# Patient Record
Sex: FEMALE | NOT HISPANIC OR LATINO
[De-identification: names, ages, dates, MRNs, and addresses within clinical notes are randomized per-mention and may not be internally consistent; named-entity substitution may affect disease eponyms.]

---

## 2017-02-13 PROBLEM — Z00.00 ENCOUNTER FOR PREVENTIVE HEALTH EXAMINATION: Status: ACTIVE | Noted: 2017-02-13

## 2017-04-13 ENCOUNTER — APPOINTMENT (OUTPATIENT)
Dept: ORTHOPEDIC SURGERY | Facility: CLINIC | Age: 52
End: 2017-04-13

## 2017-04-13 VITALS
HEART RATE: 91 BPM | SYSTOLIC BLOOD PRESSURE: 151 MMHG | TEMPERATURE: 97.9 F | WEIGHT: 293 LBS | DIASTOLIC BLOOD PRESSURE: 87 MMHG | BODY MASS INDEX: 53.92 KG/M2 | HEIGHT: 62 IN

## 2017-04-13 DIAGNOSIS — Z86.39 PERSONAL HISTORY OF OTHER ENDOCRINE, NUTRITIONAL AND METABOLIC DISEASE: ICD-10-CM

## 2017-04-13 DIAGNOSIS — E78.00 PURE HYPERCHOLESTEROLEMIA, UNSPECIFIED: ICD-10-CM

## 2017-04-13 DIAGNOSIS — M77.11 LATERAL EPICONDYLITIS, RIGHT ELBOW: ICD-10-CM

## 2017-04-13 DIAGNOSIS — Z86.79 PERSONAL HISTORY OF OTHER DISEASES OF THE CIRCULATORY SYSTEM: ICD-10-CM

## 2017-04-13 DIAGNOSIS — M18.12 UNILATERAL PRIMARY OSTEOARTHRITIS OF FIRST CARPOMETACARPAL JOINT, LEFT HAND: ICD-10-CM

## 2017-04-13 DIAGNOSIS — Z82.61 FAMILY HISTORY OF ARTHRITIS: ICD-10-CM

## 2017-04-13 DIAGNOSIS — Z87.891 PERSONAL HISTORY OF NICOTINE DEPENDENCE: ICD-10-CM

## 2017-04-13 RX ORDER — LOSARTAN POTASSIUM 100 MG/1
100 TABLET, FILM COATED ORAL
Refills: 0 | Status: ACTIVE | COMMUNITY

## 2017-04-13 RX ORDER — CHROMIUM 200 MCG
TABLET ORAL
Refills: 0 | Status: ACTIVE | COMMUNITY

## 2017-11-06 ENCOUNTER — APPOINTMENT (OUTPATIENT)
Dept: ORTHOPEDIC SURGERY | Facility: CLINIC | Age: 52
End: 2017-11-06

## 2018-04-09 ENCOUNTER — APPOINTMENT (OUTPATIENT)
Dept: ORTHOPEDIC SURGERY | Facility: CLINIC | Age: 53
End: 2018-04-09
Payer: MEDICAID

## 2018-04-09 VITALS
SYSTOLIC BLOOD PRESSURE: 143 MMHG | HEART RATE: 94 BPM | WEIGHT: 293 LBS | DIASTOLIC BLOOD PRESSURE: 79 MMHG | BODY MASS INDEX: 53.92 KG/M2 | HEIGHT: 62 IN

## 2018-04-09 DIAGNOSIS — M19.012 PRIMARY OSTEOARTHRITIS, LEFT SHOULDER: ICD-10-CM

## 2018-04-09 DIAGNOSIS — M65.312 TRIGGER THUMB, LEFT THUMB: ICD-10-CM

## 2018-04-09 DIAGNOSIS — M25.521 PAIN IN RIGHT ELBOW: ICD-10-CM

## 2018-04-09 DIAGNOSIS — M65.341 TRIGGER FINGER, RIGHT RING FINGER: ICD-10-CM

## 2018-04-09 DIAGNOSIS — M25.512 PAIN IN LEFT SHOULDER: ICD-10-CM

## 2018-04-09 DIAGNOSIS — M77.11 LATERAL EPICONDYLITIS, RIGHT ELBOW: ICD-10-CM

## 2018-04-09 DIAGNOSIS — M75.52 BURSITIS OF LEFT SHOULDER: ICD-10-CM

## 2018-04-09 DIAGNOSIS — G56.03 CARPAL TUNNEL SYNDROM,BILATERAL UPPER LIMBS: ICD-10-CM

## 2018-04-09 PROCEDURE — 20526 THER INJECTION CARP TUNNEL: CPT | Mod: 59,RT

## 2018-04-09 PROCEDURE — 99215 OFFICE O/P EST HI 40 MIN: CPT | Mod: 25

## 2018-04-09 PROCEDURE — 73080 X-RAY EXAM OF ELBOW: CPT | Mod: RT

## 2018-04-09 PROCEDURE — 20550 NJX 1 TENDON SHEATH/LIGAMENT: CPT | Mod: RT

## 2018-04-09 PROCEDURE — 73030 X-RAY EXAM OF SHOULDER: CPT | Mod: LT

## 2019-04-09 ENCOUNTER — APPOINTMENT (OUTPATIENT)
Age: 54
End: 2019-04-09
Payer: MEDICAID

## 2019-04-09 DIAGNOSIS — M17.10 UNILATERAL PRIMARY OSTEOARTHRITIS, UNSPECIFIED KNEE: ICD-10-CM

## 2019-04-09 PROCEDURE — 99214 OFFICE O/P EST MOD 30 MIN: CPT

## 2019-04-09 NOTE — PHYSICAL EXAM
[FreeTextEntry1] : Physical exam reveals a fully alert oriented patient's morbid obesity complaining about right knee pain on exam patient having full range of motion and no joint effusion no palpable mass no gross neurovascular deficit review of the MRI scan of the right knee demonstrates into a mallet and the derangement with a possible small ganglion cyst at the popliteal fossa at this stage no tumor could be identified patient was recommended to be followed by a sport knee specialist

## 2019-04-09 NOTE — HISTORY OF PRESENT ILLNESS
[FreeTextEntry1] : This is the first visit of May of 54 years old female for the last several years have been complaining about pain tenderness over the right knee related to into a mild knee derangement, recent MRI scan of the right knee demonstrated an incidental finding of a small fluid-filled cystic area at the popliteal fossa is suggested be a possible ganglion cyst unrelated to the patient complain of any pain

## 2021-07-06 ENCOUNTER — OUTPATIENT (OUTPATIENT)
Dept: OUTPATIENT SERVICES | Facility: HOSPITAL | Age: 56
LOS: 1 days | End: 2021-07-06
Payer: MEDICARE

## 2021-07-06 DIAGNOSIS — R06.02 SHORTNESS OF BREATH: ICD-10-CM

## 2021-07-06 PROCEDURE — A9500: CPT

## 2021-07-06 PROCEDURE — 78452 HT MUSCLE IMAGE SPECT MULT: CPT

## 2021-07-06 PROCEDURE — 78452 HT MUSCLE IMAGE SPECT MULT: CPT | Mod: 26

## 2021-07-06 PROCEDURE — 93016 CV STRESS TEST SUPVJ ONLY: CPT

## 2021-07-06 PROCEDURE — 93018 CV STRESS TEST I&R ONLY: CPT

## 2021-07-06 PROCEDURE — 93017 CV STRESS TEST TRACING ONLY: CPT

## 2021-07-09 ENCOUNTER — APPOINTMENT (OUTPATIENT)
Dept: OTOLARYNGOLOGY | Facility: CLINIC | Age: 56
End: 2021-07-09
Payer: MEDICARE

## 2021-07-09 VITALS
BODY MASS INDEX: 53.92 KG/M2 | WEIGHT: 293 LBS | OXYGEN SATURATION: 99 % | DIASTOLIC BLOOD PRESSURE: 86 MMHG | HEART RATE: 111 BPM | SYSTOLIC BLOOD PRESSURE: 157 MMHG | HEIGHT: 62 IN

## 2021-07-09 DIAGNOSIS — Z83.49 FAMILY HISTORY OF OTHER ENDOCRINE, NUTRITIONAL AND METABOLIC DISEASES: ICD-10-CM

## 2021-07-09 DIAGNOSIS — Z80.3 FAMILY HISTORY OF MALIGNANT NEOPLASM OF BREAST: ICD-10-CM

## 2021-07-09 PROCEDURE — 99204 OFFICE O/P NEW MOD 45 MIN: CPT | Mod: 25

## 2021-07-09 PROCEDURE — 31575 DIAGNOSTIC LARYNGOSCOPY: CPT

## 2021-07-09 RX ORDER — METFORMIN HYDROCHLORIDE 1000 MG/1
1000 TABLET, COATED ORAL
Refills: 0 | Status: ACTIVE | COMMUNITY

## 2021-07-09 RX ORDER — ROSUVASTATIN CALCIUM 20 MG/1
20 TABLET, FILM COATED ORAL
Refills: 0 | Status: ACTIVE | COMMUNITY

## 2021-07-09 RX ORDER — GLIPIZIDE 10 MG/1
10 TABLET ORAL
Refills: 0 | Status: ACTIVE | COMMUNITY

## 2021-07-09 RX ORDER — ASPIRIN 81 MG
81 TABLET, DELAYED RELEASE (ENTERIC COATED) ORAL
Refills: 0 | Status: ACTIVE | COMMUNITY

## 2021-07-09 RX ORDER — ESCITALOPRAM OXALATE 5 MG/1
TABLET, FILM COATED ORAL
Refills: 0 | Status: COMPLETED | COMMUNITY
End: 2021-07-09

## 2021-07-09 RX ORDER — SIMVASTATIN 80 MG/1
TABLET, FILM COATED ORAL
Refills: 0 | Status: COMPLETED | COMMUNITY
End: 2021-07-09

## 2021-07-09 RX ORDER — METFORMIN HYDROCHLORIDE 500 MG/1
500 TABLET, COATED ORAL
Refills: 0 | Status: COMPLETED | COMMUNITY
End: 2021-07-09

## 2021-07-09 RX ORDER — ESCITALOPRAM OXALATE 20 MG/1
20 TABLET, FILM COATED ORAL
Refills: 0 | Status: ACTIVE | COMMUNITY

## 2021-07-09 NOTE — HISTORY OF PRESENT ILLNESS
[de-identified] : This is a 56 yro female patient referred by Dr. Guzman  for thyroid nodule. This was found 2 years ago during physical exam/US.\par Pt c/o occasional dysphagia and forceful swallowing for the past 8 months. Mild right neck pain consistent over the past week. Denies dyspnea, dysphonia, fever, chills, weight loss. \par Patient denies h/o radiation and has no family h/o thyroid cancer. Sister with thyroid disease. \par Pt smoked <1 pack cig/day for about 7 years. Quit 32 years ago. \par Pt is f/up with rheumatologist for abnormal blood levels. Pt getting blood work done 7/16/21 for Dr. Guzman and PCP. \par \par US thyroid about 3 months ago showed 2 right lobe nodules. \par Biopsy 6/2/2021:\par -right thyroid medial pole nodule- benign follicular nodule\par -right thyroid midpole module- atypia of undetermined significance. Thyroseq positive. High prob of cancer.

## 2021-07-09 NOTE — REASON FOR VISIT
[Initial Evaluation] : an initial evaluation for [Other: _____] : [unfilled] [FreeTextEntry2] : thyroid nodule

## 2021-07-09 NOTE — CONSULT LETTER
[Dear  ___] : Dear  [unfilled], [Consult Letter:] : I had the pleasure of evaluating your patient, [unfilled]. [Please see my note below.] : Please see my note below. [Consult Closing:] : Thank you very much for allowing me to participate in the care of this patient.  If you have any questions, please do not hesitate to contact me. [Sincerely,] : Sincerely, [FreeTextEntry2] : Dr Abe Guzman [FreeTextEntry3] : \par Daniel Juarez MD, FACS\par \par Otolaryngology-Head and Neck Surgery\par [employer] [facility]\par

## 2021-07-13 ENCOUNTER — APPOINTMENT (OUTPATIENT)
Dept: ULTRASOUND IMAGING | Facility: CLINIC | Age: 56
End: 2021-07-13
Payer: MEDICARE

## 2021-07-13 ENCOUNTER — OUTPATIENT (OUTPATIENT)
Dept: OUTPATIENT SERVICES | Facility: HOSPITAL | Age: 56
LOS: 1 days | End: 2021-07-13

## 2021-07-13 DIAGNOSIS — C73 MALIGNANT NEOPLASM OF THYROID GLAND: ICD-10-CM

## 2021-07-13 PROCEDURE — 76536 US EXAM OF HEAD AND NECK: CPT | Mod: 26

## 2021-07-14 ENCOUNTER — TRANSCRIPTION ENCOUNTER (OUTPATIENT)
Age: 56
End: 2021-07-14

## 2021-08-31 ENCOUNTER — APPOINTMENT (OUTPATIENT)
Dept: OTOLARYNGOLOGY | Facility: CLINIC | Age: 56
End: 2021-08-31
Payer: MEDICARE

## 2021-08-31 VITALS
HEART RATE: 96 BPM | OXYGEN SATURATION: 96 % | DIASTOLIC BLOOD PRESSURE: 75 MMHG | WEIGHT: 293 LBS | HEIGHT: 62 IN | SYSTOLIC BLOOD PRESSURE: 145 MMHG | BODY MASS INDEX: 53.92 KG/M2

## 2021-08-31 VITALS — TEMPERATURE: 96.6 F

## 2021-08-31 DIAGNOSIS — Z83.49 FAMILY HISTORY OF OTHER ENDOCRINE, NUTRITIONAL AND METABOLIC DISEASES: ICD-10-CM

## 2021-08-31 DIAGNOSIS — G47.30 SLEEP APNEA, UNSPECIFIED: ICD-10-CM

## 2021-08-31 PROCEDURE — 99214 OFFICE O/P EST MOD 30 MIN: CPT

## 2021-08-31 NOTE — CONSULT LETTER
[Dear  ___] : Dear  [unfilled], [Courtesy Letter:] : I had the pleasure of seeing your patient, [unfilled], in my office today. [Please see my note below.] : Please see my note below. [Consult Closing:] : Thank you very much for allowing me to participate in the care of this patient.  If you have any questions, please do not hesitate to contact me. [Sincerely,] : Sincerely, [FreeTextEntry2] : Dr Abe Guzman  [FreeTextEntry3] : \par Daniel Juarez MD, FACS\par \par Otolaryngology-Head and Neck Surgery\par [employer] at [facility]\par

## 2021-08-31 NOTE — HISTORY OF PRESENT ILLNESS
[de-identified] : This is a 56 yro female patient w/o sleep apnea, referred by Dr. Guzman  for thyroid nodule. This was found 2 years ago during physical exam/US. Pt went elsewhere for second opinion, has decided to have surgery with us and here today to discuss the surgery. \par Pt c/o occasional dysphagia and forceful swallowing for the past 9 months. \par Left side neck swelling and tenderness for about 1 week.\par Denies dyspnea, dysphonia, hemoptysis, fever, chills, weight loss. \par Pt smoked <1 pack cig/day for about 7 years. Quit 32 years ago. \par \par \par Biopsy 6/2/2021:\par -right thyroid medial pole nodule- benign follicular nodule\par -right thyroid midpole module- atypia of undetermined significance. Thyroseq positive. High prob of cancer. \par \par US thyroid 7/13/2021: IMPRESSION:\par No evidence of cervical adenopathy.\par Right thyroid nodules.\par Right Lobe: 4.6 cm x  1.5 cm x 2.1 cm. The gland is normal in echogenicity. There is 1.1 x 0.6 x 1.1 cm heterogeneous predominantly isoechoic nodule in the midpole anteriorly and 1.0 x 0.9 x 0.8 cm heterogeneous predominant isoechoic nodule in the midpole posteriorly. There are no calcifications\par

## 2021-09-17 ENCOUNTER — APPOINTMENT (OUTPATIENT)
Dept: PULMONOLOGY | Facility: CLINIC | Age: 56
End: 2021-09-17
Payer: MEDICARE

## 2021-09-17 ENCOUNTER — NON-APPOINTMENT (OUTPATIENT)
Age: 56
End: 2021-09-17

## 2021-09-17 VITALS
DIASTOLIC BLOOD PRESSURE: 78 MMHG | WEIGHT: 293 LBS | RESPIRATION RATE: 16 BRPM | HEART RATE: 84 BPM | SYSTOLIC BLOOD PRESSURE: 144 MMHG | OXYGEN SATURATION: 97 % | HEIGHT: 62 IN | BODY MASS INDEX: 53.92 KG/M2

## 2021-09-17 DIAGNOSIS — Z87.891 PERSONAL HISTORY OF NICOTINE DEPENDENCE: ICD-10-CM

## 2021-09-17 DIAGNOSIS — Z23 ENCOUNTER FOR IMMUNIZATION: ICD-10-CM

## 2021-09-17 DIAGNOSIS — Z01.811 ENCOUNTER FOR PREPROCEDURAL RESPIRATORY EXAMINATION: ICD-10-CM

## 2021-09-17 PROCEDURE — 99204 OFFICE O/P NEW MOD 45 MIN: CPT

## 2021-09-17 RX ORDER — LANCETS
EACH MISCELLANEOUS
Qty: 102 | Refills: 0 | Status: ACTIVE | COMMUNITY
Start: 2021-09-14

## 2021-09-17 RX ORDER — BLOOD SUGAR DIAGNOSTIC
STRIP MISCELLANEOUS
Qty: 200 | Refills: 0 | Status: ACTIVE | COMMUNITY
Start: 2021-09-14

## 2021-09-17 NOTE — HISTORY OF PRESENT ILLNESS
[TextBox_4] : 57 yo lady referred by Dr Juarez for presurgical evaluation prior to thyroid surgery for possible neoplasm\par \par Known obesity, s/p lap band and then removal in past\par Diabetes on Glipizide and metformin\par HT on losartan\par HLD on meds\par \par Known previous 'severe' ANGLE and was on CPAP\par Has not been on CPAP for years tho\par Patient concerned about ANGLE with respect to upcoming surgery\par \par Minimal smoking history, yearly bronchitis in the past usually in winter\par No known asthma, no hx pneumonia\par \par Lives with two teenage daughters, \par Not working now, used to be \par No drug allergies

## 2021-09-17 NOTE — ASSESSMENT
[FreeTextEntry1] : 57 yo lady referred by Dr Juarez for presurgical evaluation prior to thyroid surgery for possible neoplasm\par \par Known obesity, s/p lap band and then removal in past\par Diabetes on Glipizide and metformin\par HT on losartan\par HLD on meds\par \par Known previous 'severe' ANGLE and was on CPAP\par Has not been on CPAP for years tho\par Patient concerned about ANGLE with respect to upcoming surgery\par \par \par \par Imp. Hx of signficant ANGLE,  \par Agree with patient for full assessment prior to her thyroid surgery\par F/u appt with sleep doctor after Home study to be arranged\par \par Doubt signficant underlying pulmonary disease but will obtain CXR and PFTs and revisit here

## 2021-09-17 NOTE — CONSULT LETTER
[Dear  ___] : Dear  [unfilled], [FreeTextEntry1] : I had the pleasure of evaluating your patient, ALLI ARANGO , in the office today.  Please review my consultation and evaluation report that follows below.  Please do not hesitate to call me if further information is necessary or if you wish to discuss ongoing care or diagnostic work-up.   \par I very much appreciate your referral and it is a privilege to be able to provide care for your patient.\par \par Sincerely,\par  \par Nikita King MD, MHCM, FACP, GABRIEL-C\par Pulmonary Medicine\par  of Medicine\par [employer] [facility] [employer] at [facility]\par [email or web address]\par \par [facility] [place]\par [address]\[place]  [address]\par    Fax \par \par [facility] [place]\par [address]\par [place], [place] \par \par

## 2021-09-21 ENCOUNTER — APPOINTMENT (OUTPATIENT)
Dept: PULMONOLOGY | Facility: CLINIC | Age: 56
End: 2021-09-21

## 2021-09-24 ENCOUNTER — OUTPATIENT (OUTPATIENT)
Dept: OUTPATIENT SERVICES | Facility: HOSPITAL | Age: 56
LOS: 1 days | End: 2021-09-24
Payer: MEDICARE

## 2021-09-24 ENCOUNTER — APPOINTMENT (OUTPATIENT)
Age: 56
End: 2021-09-24
Payer: MEDICARE

## 2021-09-24 DIAGNOSIS — G47.33 OBSTRUCTIVE SLEEP APNEA (ADULT) (PEDIATRIC): ICD-10-CM

## 2021-09-24 PROCEDURE — 95806 SLEEP STUDY UNATT&RESP EFFT: CPT | Mod: 26

## 2021-09-24 PROCEDURE — 95806 SLEEP STUDY UNATT&RESP EFFT: CPT

## 2021-10-04 ENCOUNTER — APPOINTMENT (OUTPATIENT)
Dept: PULMONOLOGY | Facility: CLINIC | Age: 56
End: 2021-10-04
Payer: MEDICARE

## 2021-10-04 VITALS
WEIGHT: 218 LBS | HEIGHT: 62 IN | OXYGEN SATURATION: 97 % | RESPIRATION RATE: 16 BRPM | BODY MASS INDEX: 40.12 KG/M2 | HEART RATE: 84 BPM | SYSTOLIC BLOOD PRESSURE: 138 MMHG | DIASTOLIC BLOOD PRESSURE: 68 MMHG

## 2021-10-04 DIAGNOSIS — E66.9 OBESITY, UNSPECIFIED: ICD-10-CM

## 2021-10-04 PROCEDURE — 99214 OFFICE O/P EST MOD 30 MIN: CPT

## 2021-10-04 NOTE — HISTORY OF PRESENT ILLNESS
[Obstructive Sleep Apnea] : obstructive sleep apnea [Daytime Somnolence] : daytime somnolence [Recent  Weight Gain] : recent weight gain [Snoring] : snoring [TextBox_4] : 57 yo lady referred by Dr Juarez for presurgical evaluation prior to thyroid surgery for possible neoplasm\par \par Known obesity, s/p lap band and then removal in past\par Diabetes on Glipizide and metformin\par HT on losartan\par HLD on meds\par \par Known previous 'severe' ANGLE and was on CPAP\par Has not been on CPAP for years though\par Patient concerned about ANGLE with respect to upcoming surgery\par \par Minimal smoking history, yearly bronchitis in the past usually in winter\par No known asthma, no hx pneumonia\par \par Lives with two teenage daughters, \par Not working now, used to be \par No drug allergies [ESS] : 4

## 2021-10-04 NOTE — DISCUSSION/SUMMARY
[FreeTextEntry1] : Morbid Obesity \par Mild ANGLE characterized by hypopneas without significant desaturation\par No significant pulmonary or sleep medicine related contraindication to thyroid surgery under general anesthesia or conscious sedation without the need for CPAP therapy\par The patient wants to try APAP again to combat fatigue but this will likely not occur prior to her surgery\par CPAP at 8 cm H20 can be used PRN in recovery or at night if she stays in the hospital post operatively, however, truncal elevation of 30 degrees and nasal 02 at 2 LPM should be more than sufficient in the hospital setting \par

## 2021-10-05 ENCOUNTER — OUTPATIENT (OUTPATIENT)
Dept: OUTPATIENT SERVICES | Facility: HOSPITAL | Age: 56
LOS: 1 days | End: 2021-10-05
Payer: MEDICARE

## 2021-10-05 DIAGNOSIS — G47.33 OBSTRUCTIVE SLEEP APNEA (ADULT) (PEDIATRIC): ICD-10-CM

## 2021-10-05 PROCEDURE — 71046 X-RAY EXAM CHEST 2 VIEWS: CPT | Mod: 26

## 2021-10-25 ENCOUNTER — APPOINTMENT (OUTPATIENT)
Dept: DISASTER EMERGENCY | Facility: CLINIC | Age: 56
End: 2021-10-25

## 2021-10-25 ENCOUNTER — OUTPATIENT (OUTPATIENT)
Dept: OUTPATIENT SERVICES | Facility: HOSPITAL | Age: 56
LOS: 1 days | End: 2021-10-25
Payer: MEDICARE

## 2021-10-25 VITALS
HEART RATE: 76 BPM | WEIGHT: 293 LBS | OXYGEN SATURATION: 98 % | TEMPERATURE: 98 F | SYSTOLIC BLOOD PRESSURE: 143 MMHG | DIASTOLIC BLOOD PRESSURE: 73 MMHG | RESPIRATION RATE: 16 BRPM | HEIGHT: 62 IN

## 2021-10-25 DIAGNOSIS — F41.9 ANXIETY DISORDER, UNSPECIFIED: ICD-10-CM

## 2021-10-25 DIAGNOSIS — E66.01 MORBID (SEVERE) OBESITY DUE TO EXCESS CALORIES: ICD-10-CM

## 2021-10-25 DIAGNOSIS — G47.33 OBSTRUCTIVE SLEEP APNEA (ADULT) (PEDIATRIC): ICD-10-CM

## 2021-10-25 DIAGNOSIS — C73 MALIGNANT NEOPLASM OF THYROID GLAND: ICD-10-CM

## 2021-10-25 DIAGNOSIS — Z98.891 HISTORY OF UTERINE SCAR FROM PREVIOUS SURGERY: Chronic | ICD-10-CM

## 2021-10-25 DIAGNOSIS — Z98.84 BARIATRIC SURGERY STATUS: Chronic | ICD-10-CM

## 2021-10-25 DIAGNOSIS — E11.9 TYPE 2 DIABETES MELLITUS WITHOUT COMPLICATIONS: ICD-10-CM

## 2021-10-25 DIAGNOSIS — I10 ESSENTIAL (PRIMARY) HYPERTENSION: ICD-10-CM

## 2021-10-25 DIAGNOSIS — R79.1 ABNORMAL COAGULATION PROFILE: ICD-10-CM

## 2021-10-25 LAB
A1C WITH ESTIMATED AVERAGE GLUCOSE RESULT: 8.5 % — HIGH (ref 4–5.6)
ALBUMIN SERPL ELPH-MCNC: 4.5 G/DL — SIGNIFICANT CHANGE UP (ref 3.3–5)
ALP SERPL-CCNC: 67 U/L — SIGNIFICANT CHANGE UP (ref 40–120)
ALT FLD-CCNC: 40 U/L — HIGH (ref 4–33)
ANION GAP SERPL CALC-SCNC: 13 MMOL/L — SIGNIFICANT CHANGE UP (ref 7–14)
APTT BLD: 35.7 SEC — SIGNIFICANT CHANGE UP (ref 27–36.3)
AST SERPL-CCNC: 41 U/L — HIGH (ref 4–32)
BILIRUB SERPL-MCNC: 0.3 MG/DL — SIGNIFICANT CHANGE UP (ref 0.2–1.2)
BUN SERPL-MCNC: 9 MG/DL — SIGNIFICANT CHANGE UP (ref 7–23)
CALCIUM SERPL-MCNC: 9.7 MG/DL — SIGNIFICANT CHANGE UP (ref 8.4–10.5)
CHLORIDE SERPL-SCNC: 104 MMOL/L — SIGNIFICANT CHANGE UP (ref 98–107)
CO2 SERPL-SCNC: 26 MMOL/L — SIGNIFICANT CHANGE UP (ref 22–31)
CREAT SERPL-MCNC: 0.7 MG/DL — SIGNIFICANT CHANGE UP (ref 0.5–1.3)
ESTIMATED AVERAGE GLUCOSE: 197 — SIGNIFICANT CHANGE UP
GLUCOSE SERPL-MCNC: 91 MG/DL — SIGNIFICANT CHANGE UP (ref 70–99)
HCT VFR BLD CALC: 38.8 % — SIGNIFICANT CHANGE UP (ref 34.5–45)
HGB BLD-MCNC: 12.3 G/DL — SIGNIFICANT CHANGE UP (ref 11.5–15.5)
INR BLD: 1.09 RATIO — SIGNIFICANT CHANGE UP (ref 0.88–1.16)
MCHC RBC-ENTMCNC: 28.3 PG — SIGNIFICANT CHANGE UP (ref 27–34)
MCHC RBC-ENTMCNC: 31.7 GM/DL — LOW (ref 32–36)
MCV RBC AUTO: 89.4 FL — SIGNIFICANT CHANGE UP (ref 80–100)
NRBC # BLD: 0 /100 WBCS — SIGNIFICANT CHANGE UP
NRBC # FLD: 0 K/UL — SIGNIFICANT CHANGE UP
PLATELET # BLD AUTO: 322 K/UL — SIGNIFICANT CHANGE UP (ref 150–400)
POTASSIUM SERPL-MCNC: 4.3 MMOL/L — SIGNIFICANT CHANGE UP (ref 3.5–5.3)
POTASSIUM SERPL-SCNC: 4.3 MMOL/L — SIGNIFICANT CHANGE UP (ref 3.5–5.3)
PROT SERPL-MCNC: 7.7 G/DL — SIGNIFICANT CHANGE UP (ref 6–8.3)
PROTHROM AB SERPL-ACNC: 12.5 SEC — SIGNIFICANT CHANGE UP (ref 10.6–13.6)
RBC # BLD: 4.34 M/UL — SIGNIFICANT CHANGE UP (ref 3.8–5.2)
RBC # FLD: 13.7 % — SIGNIFICANT CHANGE UP (ref 10.3–14.5)
SODIUM SERPL-SCNC: 143 MMOL/L — SIGNIFICANT CHANGE UP (ref 135–145)
WBC # BLD: 9.18 K/UL — SIGNIFICANT CHANGE UP (ref 3.8–10.5)
WBC # FLD AUTO: 9.18 K/UL — SIGNIFICANT CHANGE UP (ref 3.8–10.5)

## 2021-10-25 PROCEDURE — 93010 ELECTROCARDIOGRAM REPORT: CPT

## 2021-10-25 RX ORDER — SODIUM CHLORIDE 9 MG/ML
1000 INJECTION, SOLUTION INTRAVENOUS
Refills: 0 | Status: DISCONTINUED | OUTPATIENT
Start: 2021-11-08 | End: 2021-11-08

## 2021-10-25 RX ORDER — SODIUM CHLORIDE 9 MG/ML
3 INJECTION INTRAMUSCULAR; INTRAVENOUS; SUBCUTANEOUS EVERY 8 HOURS
Refills: 0 | Status: DISCONTINUED | OUTPATIENT
Start: 2021-11-08 | End: 2021-11-12

## 2021-10-25 NOTE — H&P PST ADULT - HISTORY OF PRESENT ILLNESS
55 y/o female with type 2 DM, HTN, HLD, Mild ANGLE (not on CPAP), Morbid Obesity, Fatty liver, depression and anxiety presents to PST preop for total thyroidectomy. pt diagnosed with thyroid nodule 2 year ago. pt had an ultrasound of the neck which showed two right lobe nodules.  s/p biopsy on 6/2/21. pt states one nodule showed atypical cells and "90% of cancer".  55 y/o female with Type 2 DM, HTN, HLD, Mild ANGLE (not on CPAP), Morbid Obesity, Fatty liver, depression and anxiety presents to PST preop for total thyroidectomy. pt diagnosed with thyroid nodule 2 years ago. pt had an ultrasound of the neck which showed two right lobe nodules.  s/p biopsy on 6/2/21. pt states one nodule showed atypical cells and "90% risk of cancer".

## 2021-10-25 NOTE — H&P PST ADULT - PROBLEM SELECTOR PLAN 7
pt reports "abnormal clotting protein"- seen by hematology in August pt reports "abnormal clotting protein"- seen by hematology in August  last hematology visit note requested

## 2021-10-25 NOTE — H&P PST ADULT - OTHER CARE PROVIDERS
Cardiologist Dr. Kimball [phone]    Pulmonologist Dr. Nikita King  Endocrinologist Dr. Guzman  Hematologist Dr. Radha Merida [phone]

## 2021-10-25 NOTE — H&P PST ADULT - PROBLEM SELECTOR PLAN 1
preop for total thyroidectomy on 11/8/21  preop instructions given, pt verbalized understanding  GI prophylaxis and chlorhexidine wash provided  pt is scheduled for COVID testing preop

## 2021-10-25 NOTE — H&P PST ADULT - PROBLEM SELECTOR PLAN 2
pt will take blood pressure meds Losartan AM of surgery as prescribed  cardiac clearance and ECHO requested (pt reports SOB on exertion, h/o HTN)

## 2021-10-25 NOTE — H&P PST ADULT - NSICDXPASTMEDICALHX_GEN_ALL_CORE_FT
PAST MEDICAL HISTORY:  Anxiety and depression     Bilateral carpal tunnel syndrome     Bulging lumbar disc     Fatty liver     HLD (hyperlipidemia)     HTN (hypertension)     Left shoulder pain     Malignant neoplasm of thyroid gland     Morbid obesity     OA (osteoarthritis)     ANGLE (obstructive sleep apnea) mild ANGLE    Right tennis elbow and left elbow    Thyroid nodule     Type 2 diabetes mellitus

## 2021-10-25 NOTE — H&P PST ADULT - NEGATIVE GENERAL GENITOURINARY SYMPTOMS
no hematuria/no flank pain L/no flank pain R/no bladder infections/no incontinence/normal urinary frequency

## 2021-10-25 NOTE — H&P PST ADULT - HEMATOLOGY/LYMPHATICS COMMENTS
pt reports "abnormal blood clotting proteins" seen by hematologist in July and told levels were normal and not to be concerned

## 2021-10-25 NOTE — H&P PST ADULT - NSICDXPASTSURGICALHX_GEN_ALL_CORE_FT
PAST SURGICAL HISTORY:  H/O  section  and     History of laparoscopic adjustable gastric banding  and removed in

## 2021-10-25 NOTE — H&P PST ADULT - PROBLEM SELECTOR PLAN 3
pt will hold glipizide AM of surgery   metformin to be held 24 hours preop  accu check to be assessed on admission

## 2021-10-26 LAB — SARS-COV-2 N GENE NPH QL NAA+PROBE: NOT DETECTED

## 2021-10-29 ENCOUNTER — APPOINTMENT (OUTPATIENT)
Dept: PULMONOLOGY | Facility: CLINIC | Age: 56
End: 2021-10-29
Payer: MEDICARE

## 2021-10-29 VITALS
RESPIRATION RATE: 15 BRPM | BODY MASS INDEX: 53.92 KG/M2 | WEIGHT: 293 LBS | DIASTOLIC BLOOD PRESSURE: 82 MMHG | SYSTOLIC BLOOD PRESSURE: 128 MMHG | HEIGHT: 62 IN | OXYGEN SATURATION: 95 % | HEART RATE: 88 BPM

## 2021-10-29 VITALS — BODY MASS INDEX: 53.92 KG/M2 | WEIGHT: 293 LBS | HEIGHT: 62 IN

## 2021-10-29 DIAGNOSIS — Z01.818 ENCOUNTER FOR OTHER PREPROCEDURAL EXAMINATION: ICD-10-CM

## 2021-10-29 PROCEDURE — 85018 HEMOGLOBIN: CPT | Mod: QW

## 2021-10-29 PROCEDURE — 94727 GAS DIL/WSHOT DETER LNG VOL: CPT

## 2021-10-29 PROCEDURE — 99214 OFFICE O/P EST MOD 30 MIN: CPT | Mod: 25

## 2021-10-29 PROCEDURE — 94010 BREATHING CAPACITY TEST: CPT

## 2021-10-29 PROCEDURE — 94729 DIFFUSING CAPACITY: CPT

## 2021-10-29 NOTE — ASSESSMENT
[FreeTextEntry1] : \par 57 yo lady referred by Dr Juarez for presurgical evaluation prior to thyroid surgery for possible neoplasm\par \par Known obesity, s/p lap band and then removal in past\par Diabetes on Glipizide and metformin\par HT on losartan\par HLD on meds\par \par Known previous 'severe' ANGLE and was on CPAP\par Has not been on CPAP for years tho\par Patient concerned about ANGLE with respect to upcoming surgery\par \par Minimal smoking history, yearly bronchitis in the past usually in winter\par No known asthma, no hx pneumonia\par \par Lives with two teenage daughters, \par Not working now, used to be \par No drug allergies. \par \par Patient had sleep study, Mild ANGLE noted, CPAP ordered by Dr Ozuna indicated that she may proceed with surgery from an ANGLE standpoint\par PFTs show possible small airways disease but most changes appear due to obesity\par \par Imp 57 yo lady with hx bronchitis, HT DM and HLD and morbid obesity\par Respiratory status is optimized although weight loss is advised\par No pulmonary contraindication to planned thyroid surgery

## 2021-10-29 NOTE — PROCEDURE
[FreeTextEntry1] : Normal TLC and DLCo but decr in FRC c/w body habitus\par FEV1% fine but FVC is lower that slow VC\par Decrease in midexpir flow perhaps due to small airways disease

## 2021-10-29 NOTE — HISTORY OF PRESENT ILLNESS
[TextBox_4] : \par 57 yo lady referred by Dr Juarez for presurgical evaluation prior to thyroid surgery for possible neoplasm\par \par Known obesity, s/p lap band and then removal in past\par Diabetes on Glipizide and metformin\par HT on losartan\par HLD on meds\par \par Known previous 'severe' ANGLE and was on CPAP\par Has not been on CPAP for years tho\par Patient concerned about ANGLE with respect to upcoming surgery\par \par Minimal smoking history, yearly bronchitis in the past usually in winter\par No known asthma, no hx pneumonia\par \par Lives with two teenage daughters, \par Not working now, used to be \par No drug allergies.

## 2021-10-29 NOTE — CONSULT LETTER
[Dear  ___] : Dear  [unfilled], [FreeTextEntry1] : I had the pleasure of evaluating your patient, ALLI ARANGO , in the office today.  Please review my consultation and evaluation report that follows below.  Please do not hesitate to call me if further information is necessary or if you wish to discuss ongoing care or diagnostic work-up.   \par I very much appreciate your referral and it is a privilege to be able to provide care for your patient.\par \par Sincerely,\par  \par Nikita King MD, MHCM, FACP, GABRIEL-C\par Pulmonary Medicine\par  of Medicine\par [employer] [facility] [employer] at [facility]\par [email or web address]\par \par [facility] in [place]\par [address]\[place]  [address]\par    Fax \par \par [facility] [place]\par [address]\par [place], [place] \par \par

## 2021-11-05 ENCOUNTER — APPOINTMENT (OUTPATIENT)
Dept: DISASTER EMERGENCY | Facility: CLINIC | Age: 56
End: 2021-11-05

## 2021-11-06 LAB — SARS-COV-2 N GENE NPH QL NAA+PROBE: NOT DETECTED

## 2021-11-07 ENCOUNTER — TRANSCRIPTION ENCOUNTER (OUTPATIENT)
Age: 56
End: 2021-11-07

## 2021-11-08 ENCOUNTER — NON-APPOINTMENT (OUTPATIENT)
Age: 56
End: 2021-11-08

## 2021-11-08 ENCOUNTER — RESULT REVIEW (OUTPATIENT)
Age: 56
End: 2021-11-08

## 2021-11-08 ENCOUNTER — INPATIENT (INPATIENT)
Facility: HOSPITAL | Age: 56
LOS: 3 days | Discharge: HOME | End: 2021-11-12
Attending: OTOLARYNGOLOGY | Admitting: OTOLARYNGOLOGY
Payer: MEDICARE

## 2021-11-08 ENCOUNTER — APPOINTMENT (OUTPATIENT)
Dept: OTOLARYNGOLOGY | Facility: HOSPITAL | Age: 56
End: 2021-11-08

## 2021-11-08 VITALS
HEART RATE: 81 BPM | RESPIRATION RATE: 16 BRPM | HEIGHT: 62 IN | SYSTOLIC BLOOD PRESSURE: 136 MMHG | WEIGHT: 293 LBS | OXYGEN SATURATION: 95 % | DIASTOLIC BLOOD PRESSURE: 65 MMHG | TEMPERATURE: 98 F

## 2021-11-08 DIAGNOSIS — C73 MALIGNANT NEOPLASM OF THYROID GLAND: ICD-10-CM

## 2021-11-08 DIAGNOSIS — Z98.84 BARIATRIC SURGERY STATUS: Chronic | ICD-10-CM

## 2021-11-08 DIAGNOSIS — Z98.891 HISTORY OF UTERINE SCAR FROM PREVIOUS SURGERY: Chronic | ICD-10-CM

## 2021-11-08 LAB
ANION GAP SERPL CALC-SCNC: 15 MMOL/L — HIGH (ref 7–14)
BLOOD GAS ARTERIAL - LYTES,HGB,ICA,LACT RESULT: SIGNIFICANT CHANGE UP
BUN SERPL-MCNC: 14 MG/DL — SIGNIFICANT CHANGE UP (ref 7–23)
CALCIUM SERPL-MCNC: 9.1 MG/DL — SIGNIFICANT CHANGE UP (ref 8.4–10.5)
CHLORIDE SERPL-SCNC: 101 MMOL/L — SIGNIFICANT CHANGE UP (ref 98–107)
CO2 SERPL-SCNC: 25 MMOL/L — SIGNIFICANT CHANGE UP (ref 22–31)
CREAT SERPL-MCNC: 0.84 MG/DL — SIGNIFICANT CHANGE UP (ref 0.5–1.3)
GAS PNL BLDA: SIGNIFICANT CHANGE UP
GLUCOSE BLDC GLUCOMTR-MCNC: 108 MG/DL — HIGH (ref 70–99)
GLUCOSE BLDC GLUCOMTR-MCNC: 116 MG/DL — HIGH (ref 70–99)
GLUCOSE BLDC GLUCOMTR-MCNC: 155 MG/DL — HIGH (ref 70–99)
GLUCOSE BLDC GLUCOMTR-MCNC: 178 MG/DL — HIGH (ref 70–99)
GLUCOSE BLDC GLUCOMTR-MCNC: 183 MG/DL — HIGH (ref 70–99)
GLUCOSE BLDC GLUCOMTR-MCNC: 223 MG/DL — HIGH (ref 70–99)
GLUCOSE BLDC GLUCOMTR-MCNC: 227 MG/DL — HIGH (ref 70–99)
GLUCOSE BLDC GLUCOMTR-MCNC: 241 MG/DL — HIGH (ref 70–99)
GLUCOSE BLDC GLUCOMTR-MCNC: 248 MG/DL — HIGH (ref 70–99)
GLUCOSE BLDC GLUCOMTR-MCNC: 257 MG/DL — HIGH (ref 70–99)
GLUCOSE BLDC GLUCOMTR-MCNC: 331 MG/DL — HIGH (ref 70–99)
GLUCOSE BLDC GLUCOMTR-MCNC: 93 MG/DL — SIGNIFICANT CHANGE UP (ref 70–99)
GLUCOSE SERPL-MCNC: 281 MG/DL — HIGH (ref 70–99)
HCT VFR BLD CALC: 40.4 % — SIGNIFICANT CHANGE UP (ref 34.5–45)
HGB BLD-MCNC: 12.6 G/DL — SIGNIFICANT CHANGE UP (ref 11.5–15.5)
MAGNESIUM SERPL-MCNC: 1.9 MG/DL — SIGNIFICANT CHANGE UP (ref 1.6–2.6)
MCHC RBC-ENTMCNC: 28.3 PG — SIGNIFICANT CHANGE UP (ref 27–34)
MCHC RBC-ENTMCNC: 31.2 GM/DL — LOW (ref 32–36)
MCV RBC AUTO: 90.6 FL — SIGNIFICANT CHANGE UP (ref 80–100)
NRBC # BLD: 0 /100 WBCS — SIGNIFICANT CHANGE UP
NRBC # FLD: 0 K/UL — SIGNIFICANT CHANGE UP
PHOSPHATE SERPL-MCNC: 4.2 MG/DL — SIGNIFICANT CHANGE UP (ref 2.5–4.5)
PLATELET # BLD AUTO: 300 K/UL — SIGNIFICANT CHANGE UP (ref 150–400)
POTASSIUM SERPL-MCNC: 4.1 MMOL/L — SIGNIFICANT CHANGE UP (ref 3.5–5.3)
POTASSIUM SERPL-SCNC: 4.1 MMOL/L — SIGNIFICANT CHANGE UP (ref 3.5–5.3)
PTH-INTACT FLD-MCNC: 29 PG/ML — SIGNIFICANT CHANGE UP (ref 15–65)
RBC # BLD: 4.46 M/UL — SIGNIFICANT CHANGE UP (ref 3.8–5.2)
RBC # FLD: 13.7 % — SIGNIFICANT CHANGE UP (ref 10.3–14.5)
SODIUM SERPL-SCNC: 141 MMOL/L — SIGNIFICANT CHANGE UP (ref 135–145)
WBC # BLD: 12.77 K/UL — HIGH (ref 3.8–10.5)
WBC # FLD AUTO: 12.77 K/UL — HIGH (ref 3.8–10.5)

## 2021-11-08 PROCEDURE — 88334 PATH CONSLTJ SURG CYTO XM EA: CPT | Mod: 26,59

## 2021-11-08 PROCEDURE — 88331 PATH CONSLTJ SURG 1 BLK 1SPC: CPT | Mod: 26

## 2021-11-08 PROCEDURE — 71045 X-RAY EXAM CHEST 1 VIEW: CPT | Mod: 26

## 2021-11-08 PROCEDURE — 88307 TISSUE EXAM BY PATHOLOGIST: CPT | Mod: 26

## 2021-11-08 PROCEDURE — 88305 TISSUE EXAM BY PATHOLOGIST: CPT | Mod: 26

## 2021-11-08 RX ORDER — ONDANSETRON 8 MG/1
4 TABLET, FILM COATED ORAL ONCE
Refills: 0 | Status: COMPLETED | OUTPATIENT
Start: 2021-11-08 | End: 2021-11-08

## 2021-11-08 RX ORDER — ESCITALOPRAM OXALATE 10 MG/1
20 TABLET, FILM COATED ORAL DAILY
Refills: 0 | Status: DISCONTINUED | OUTPATIENT
Start: 2021-11-09 | End: 2021-11-12

## 2021-11-08 RX ORDER — CHOLECALCIFEROL (VITAMIN D3) 125 MCG
1 CAPSULE ORAL
Qty: 0 | Refills: 0 | DISCHARGE

## 2021-11-08 RX ORDER — DEXTROSE 50 % IN WATER 50 %
25 SYRINGE (ML) INTRAVENOUS
Refills: 0 | Status: DISCONTINUED | OUTPATIENT
Start: 2021-11-08 | End: 2021-11-12

## 2021-11-08 RX ORDER — LOSARTAN POTASSIUM 100 MG/1
100 TABLET, FILM COATED ORAL DAILY
Refills: 0 | Status: DISCONTINUED | OUTPATIENT
Start: 2021-11-09 | End: 2021-11-12

## 2021-11-08 RX ORDER — DEXTROSE 50 % IN WATER 50 %
15 SYRINGE (ML) INTRAVENOUS ONCE
Refills: 0 | Status: DISCONTINUED | OUTPATIENT
Start: 2021-11-08 | End: 2021-11-12

## 2021-11-08 RX ORDER — INSULIN HUMAN 100 [IU]/ML
5 INJECTION, SOLUTION SUBCUTANEOUS
Qty: 50 | Refills: 0 | Status: DISCONTINUED | OUTPATIENT
Start: 2021-11-08 | End: 2021-11-08

## 2021-11-08 RX ORDER — FAMOTIDINE 10 MG/ML
20 INJECTION INTRAVENOUS ONCE
Refills: 0 | Status: DISCONTINUED | OUTPATIENT
Start: 2021-11-08 | End: 2021-11-12

## 2021-11-08 RX ORDER — ACETAMINOPHEN 500 MG
650 TABLET ORAL EVERY 6 HOURS
Refills: 0 | Status: DISCONTINUED | OUTPATIENT
Start: 2021-11-08 | End: 2021-11-12

## 2021-11-08 RX ORDER — INSULIN LISPRO 100/ML
20 VIAL (ML) SUBCUTANEOUS EVERY 8 HOURS
Refills: 0 | Status: DISCONTINUED | OUTPATIENT
Start: 2021-11-08 | End: 2021-11-09

## 2021-11-08 RX ORDER — HYDROMORPHONE HYDROCHLORIDE 2 MG/ML
0.5 INJECTION INTRAMUSCULAR; INTRAVENOUS; SUBCUTANEOUS
Refills: 0 | Status: DISCONTINUED | OUTPATIENT
Start: 2021-11-08 | End: 2021-11-08

## 2021-11-08 RX ORDER — INSULIN HUMAN 100 [IU]/ML
10.5 INJECTION, SOLUTION SUBCUTANEOUS
Qty: 100 | Refills: 0 | Status: DISCONTINUED | OUTPATIENT
Start: 2021-11-08 | End: 2021-11-09

## 2021-11-08 RX ORDER — INSULIN GLARGINE 100 [IU]/ML
75 INJECTION, SOLUTION SUBCUTANEOUS ONCE
Refills: 0 | Status: COMPLETED | OUTPATIENT
Start: 2021-11-08 | End: 2021-11-08

## 2021-11-08 RX ORDER — OXYCODONE HYDROCHLORIDE 5 MG/1
10 TABLET ORAL EVERY 6 HOURS
Refills: 0 | Status: DISCONTINUED | OUTPATIENT
Start: 2021-11-08 | End: 2021-11-12

## 2021-11-08 RX ORDER — SODIUM CHLORIDE 9 MG/ML
1000 INJECTION, SOLUTION INTRAVENOUS
Refills: 0 | Status: DISCONTINUED | OUTPATIENT
Start: 2021-11-08 | End: 2021-11-09

## 2021-11-08 RX ORDER — ASPIRIN/CALCIUM CARB/MAGNESIUM 324 MG
0 TABLET ORAL
Qty: 0 | Refills: 0 | DISCHARGE

## 2021-11-08 RX ORDER — LOSARTAN POTASSIUM 100 MG/1
1 TABLET, FILM COATED ORAL
Qty: 0 | Refills: 0 | DISCHARGE

## 2021-11-08 RX ORDER — DEXTROSE 50 % IN WATER 50 %
50 SYRINGE (ML) INTRAVENOUS
Refills: 0 | Status: DISCONTINUED | OUTPATIENT
Start: 2021-11-08 | End: 2021-11-12

## 2021-11-08 RX ORDER — LEVOTHYROXINE SODIUM 125 MCG
25 TABLET ORAL DAILY
Refills: 0 | Status: DISCONTINUED | OUTPATIENT
Start: 2021-11-09 | End: 2021-11-12

## 2021-11-08 RX ORDER — INFLUENZA VIRUS VACCINE 15; 15; 15; 15 UG/.5ML; UG/.5ML; UG/.5ML; UG/.5ML
0.5 SUSPENSION INTRAMUSCULAR ONCE
Refills: 0 | Status: DISCONTINUED | OUTPATIENT
Start: 2021-11-08 | End: 2021-11-12

## 2021-11-08 RX ORDER — SODIUM CHLORIDE 9 MG/ML
1000 INJECTION, SOLUTION INTRAVENOUS
Refills: 0 | Status: DISCONTINUED | OUTPATIENT
Start: 2021-11-08 | End: 2021-11-12

## 2021-11-08 RX ORDER — ESCITALOPRAM OXALATE 10 MG/1
1 TABLET, FILM COATED ORAL
Qty: 0 | Refills: 0 | DISCHARGE

## 2021-11-08 RX ORDER — LEVOTHYROXINE SODIUM 125 MCG
200 TABLET ORAL DAILY
Refills: 0 | Status: DISCONTINUED | OUTPATIENT
Start: 2021-11-09 | End: 2021-11-12

## 2021-11-08 RX ORDER — LABETALOL HCL 100 MG
10 TABLET ORAL ONCE
Refills: 0 | Status: COMPLETED | OUTPATIENT
Start: 2021-11-08 | End: 2021-11-08

## 2021-11-08 RX ORDER — ROSUVASTATIN CALCIUM 5 MG/1
1 TABLET ORAL
Qty: 0 | Refills: 0 | DISCHARGE

## 2021-11-08 RX ORDER — OXYCODONE HYDROCHLORIDE 5 MG/1
5 TABLET ORAL EVERY 6 HOURS
Refills: 0 | Status: DISCONTINUED | OUTPATIENT
Start: 2021-11-08 | End: 2021-11-12

## 2021-11-08 RX ORDER — GLUCAGON INJECTION, SOLUTION 0.5 MG/.1ML
1 INJECTION, SOLUTION SUBCUTANEOUS ONCE
Refills: 0 | Status: DISCONTINUED | OUTPATIENT
Start: 2021-11-08 | End: 2021-11-12

## 2021-11-08 RX ORDER — INSULIN GLARGINE 100 [IU]/ML
75 INJECTION, SOLUTION SUBCUTANEOUS AT BEDTIME
Refills: 0 | Status: DISCONTINUED | OUTPATIENT
Start: 2021-11-09 | End: 2021-11-10

## 2021-11-08 RX ORDER — DIPHENHYDRAMINE HCL 50 MG
12.5 CAPSULE ORAL ONCE
Refills: 0 | Status: COMPLETED | OUTPATIENT
Start: 2021-11-08 | End: 2021-11-08

## 2021-11-08 RX ADMIN — INSULIN HUMAN 10.5 UNIT(S)/HR: 100 INJECTION, SOLUTION SUBCUTANEOUS at 21:13

## 2021-11-08 RX ADMIN — SODIUM CHLORIDE 30 MILLILITER(S): 9 INJECTION, SOLUTION INTRAVENOUS at 12:22

## 2021-11-08 RX ADMIN — INSULIN HUMAN 10 UNIT(S)/HR: 100 INJECTION, SOLUTION SUBCUTANEOUS at 19:12

## 2021-11-08 RX ADMIN — INSULIN GLARGINE 75 UNIT(S): 100 INJECTION, SOLUTION SUBCUTANEOUS at 22:57

## 2021-11-08 RX ADMIN — INSULIN HUMAN 12 UNIT(S)/HR: 100 INJECTION, SOLUTION SUBCUTANEOUS at 18:57

## 2021-11-08 RX ADMIN — Medication 12.5 MILLIGRAM(S): at 12:28

## 2021-11-08 RX ADMIN — SODIUM CHLORIDE 3 MILLILITER(S): 9 INJECTION INTRAMUSCULAR; INTRAVENOUS; SUBCUTANEOUS at 21:14

## 2021-11-08 RX ADMIN — SODIUM CHLORIDE 3 MILLILITER(S): 9 INJECTION INTRAMUSCULAR; INTRAVENOUS; SUBCUTANEOUS at 13:42

## 2021-11-08 RX ADMIN — INSULIN HUMAN 9 UNIT(S)/HR: 100 INJECTION, SOLUTION SUBCUTANEOUS at 15:58

## 2021-11-08 RX ADMIN — INSULIN HUMAN 11 UNIT(S)/HR: 100 INJECTION, SOLUTION SUBCUTANEOUS at 17:41

## 2021-11-08 RX ADMIN — ONDANSETRON 4 MILLIGRAM(S): 8 TABLET, FILM COATED ORAL at 18:10

## 2021-11-08 RX ADMIN — Medication 10 MILLIGRAM(S): at 17:02

## 2021-11-08 RX ADMIN — SODIUM CHLORIDE 30 MILLILITER(S): 9 INJECTION, SOLUTION INTRAVENOUS at 15:59

## 2021-11-08 RX ADMIN — SODIUM CHLORIDE 30 MILLILITER(S): 9 INJECTION, SOLUTION INTRAVENOUS at 22:57

## 2021-11-08 RX ADMIN — INSULIN HUMAN 5 UNIT(S)/HR: 100 INJECTION, SOLUTION SUBCUTANEOUS at 12:28

## 2021-11-08 NOTE — CONSULT NOTE ADULT - ATTENDING COMMENTS
Patient s/p total thyroidectomy requiring sicu for insulin gtt. Patient will plan to start basal insulin and wean off insulin gtt    I have personally interviewed when possible and examined the patient, reviewed data and laboratory tests/x-rays and all pertinent electronic images.  I was physically present for the key portions of the evaluation and management (E/M) service provided.   The SICU team has a constant risk benefit analyzes discussion with the primary team, all consultants, House Staff and PA's on all decisions.  These diagnoses are unrelated to the surgical procedure noted above.  I meet with family if needed to get further history, discuss the case and make care decisions for this patient who might not be able to participate.  Time involved in performance of separately billable procedures was not counted toward my critical care time. There is no overlap.  I spent 30 minutes ( 0800Hrs-0915Hrs in AM/ 1600Hrs-1715Hrs in PM, or other time indicated) of critical care time for the diagnoses, assessment, plan and interventions.  This time excludes time spent on separate procedures and teaching.

## 2021-11-08 NOTE — PATIENT PROFILE ADULT - PACKS YRS CALCULATION
Physical Therapy  3CD PT attempt NOTE:    Patient not seen in therapy today.     Re-attempt plan: per established plan of care    Pt is at an MRI. Will re-attempt as schedule allows.                                   0

## 2021-11-09 ENCOUNTER — TRANSCRIPTION ENCOUNTER (OUTPATIENT)
Age: 56
End: 2021-11-09

## 2021-11-09 LAB
ANION GAP SERPL CALC-SCNC: 12 MMOL/L — SIGNIFICANT CHANGE UP (ref 7–14)
BASOPHILS # BLD AUTO: 0.03 K/UL — SIGNIFICANT CHANGE UP (ref 0–0.2)
BASOPHILS NFR BLD AUTO: 0.2 % — SIGNIFICANT CHANGE UP (ref 0–2)
BUN SERPL-MCNC: 13 MG/DL — SIGNIFICANT CHANGE UP (ref 7–23)
CALCIUM SERPL-MCNC: 8.8 MG/DL — SIGNIFICANT CHANGE UP (ref 8.4–10.5)
CALCIUM SERPL-MCNC: 8.9 MG/DL — SIGNIFICANT CHANGE UP (ref 8.4–10.5)
CHLORIDE SERPL-SCNC: 101 MMOL/L — SIGNIFICANT CHANGE UP (ref 98–107)
CO2 SERPL-SCNC: 26 MMOL/L — SIGNIFICANT CHANGE UP (ref 22–31)
COVID-19 NUCLEOCAPSID GAM AB INTERP: POSITIVE
COVID-19 NUCLEOCAPSID TOTAL GAM ANTIBODY RESULT: 198 INDEX — HIGH
COVID-19 SPIKE DOMAIN AB INTERP: POSITIVE
COVID-19 SPIKE DOMAIN ANTIBODY RESULT: >250 U/ML — HIGH
CREAT SERPL-MCNC: 0.73 MG/DL — SIGNIFICANT CHANGE UP (ref 0.5–1.3)
EOSINOPHIL # BLD AUTO: 0 K/UL — SIGNIFICANT CHANGE UP (ref 0–0.5)
EOSINOPHIL NFR BLD AUTO: 0 % — SIGNIFICANT CHANGE UP (ref 0–6)
GLUCOSE BLDC GLUCOMTR-MCNC: 116 MG/DL — HIGH (ref 70–99)
GLUCOSE BLDC GLUCOMTR-MCNC: 150 MG/DL — HIGH (ref 70–99)
GLUCOSE BLDC GLUCOMTR-MCNC: 161 MG/DL — HIGH (ref 70–99)
GLUCOSE BLDC GLUCOMTR-MCNC: 177 MG/DL — HIGH (ref 70–99)
GLUCOSE BLDC GLUCOMTR-MCNC: 182 MG/DL — HIGH (ref 70–99)
GLUCOSE BLDC GLUCOMTR-MCNC: 184 MG/DL — HIGH (ref 70–99)
GLUCOSE BLDC GLUCOMTR-MCNC: 88 MG/DL — SIGNIFICANT CHANGE UP (ref 70–99)
GLUCOSE BLDC GLUCOMTR-MCNC: 91 MG/DL — SIGNIFICANT CHANGE UP (ref 70–99)
GLUCOSE SERPL-MCNC: 149 MG/DL — HIGH (ref 70–99)
HCT VFR BLD CALC: 36.1 % — SIGNIFICANT CHANGE UP (ref 34.5–45)
HGB BLD-MCNC: 11.6 G/DL — SIGNIFICANT CHANGE UP (ref 11.5–15.5)
IANC: 9.84 K/UL — HIGH (ref 1.5–8.5)
IMM GRANULOCYTES NFR BLD AUTO: 0.5 % — SIGNIFICANT CHANGE UP (ref 0–1.5)
LYMPHOCYTES # BLD AUTO: 1.5 K/UL — SIGNIFICANT CHANGE UP (ref 1–3.3)
LYMPHOCYTES # BLD AUTO: 12.3 % — LOW (ref 13–44)
MAGNESIUM SERPL-MCNC: 2 MG/DL — SIGNIFICANT CHANGE UP (ref 1.6–2.6)
MCHC RBC-ENTMCNC: 28.5 PG — SIGNIFICANT CHANGE UP (ref 27–34)
MCHC RBC-ENTMCNC: 32.1 GM/DL — SIGNIFICANT CHANGE UP (ref 32–36)
MCV RBC AUTO: 88.7 FL — SIGNIFICANT CHANGE UP (ref 80–100)
MONOCYTES # BLD AUTO: 0.74 K/UL — SIGNIFICANT CHANGE UP (ref 0–0.9)
MONOCYTES NFR BLD AUTO: 6.1 % — SIGNIFICANT CHANGE UP (ref 2–14)
NEUTROPHILS # BLD AUTO: 9.84 K/UL — HIGH (ref 1.8–7.4)
NEUTROPHILS NFR BLD AUTO: 80.9 % — HIGH (ref 43–77)
NRBC # BLD: 0 /100 WBCS — SIGNIFICANT CHANGE UP
NRBC # FLD: 0 K/UL — SIGNIFICANT CHANGE UP
PHOSPHATE SERPL-MCNC: 4.3 MG/DL — SIGNIFICANT CHANGE UP (ref 2.5–4.5)
PLATELET # BLD AUTO: 305 K/UL — SIGNIFICANT CHANGE UP (ref 150–400)
POTASSIUM SERPL-MCNC: 4.3 MMOL/L — SIGNIFICANT CHANGE UP (ref 3.5–5.3)
POTASSIUM SERPL-SCNC: 4.3 MMOL/L — SIGNIFICANT CHANGE UP (ref 3.5–5.3)
RBC # BLD: 4.07 M/UL — SIGNIFICANT CHANGE UP (ref 3.8–5.2)
RBC # FLD: 13.8 % — SIGNIFICANT CHANGE UP (ref 10.3–14.5)
SARS-COV-2 IGG+IGM SERPL QL IA: 198 INDEX — HIGH
SARS-COV-2 IGG+IGM SERPL QL IA: >250 U/ML — HIGH
SARS-COV-2 IGG+IGM SERPL QL IA: POSITIVE
SARS-COV-2 IGG+IGM SERPL QL IA: POSITIVE
SODIUM SERPL-SCNC: 139 MMOL/L — SIGNIFICANT CHANGE UP (ref 135–145)
WBC # BLD: 12.17 K/UL — HIGH (ref 3.8–10.5)
WBC # FLD AUTO: 12.17 K/UL — HIGH (ref 3.8–10.5)

## 2021-11-09 PROCEDURE — 99232 SBSQ HOSP IP/OBS MODERATE 35: CPT

## 2021-11-09 RX ORDER — INSULIN LISPRO 100/ML
VIAL (ML) SUBCUTANEOUS
Refills: 0 | Status: DISCONTINUED | OUTPATIENT
Start: 2021-11-09 | End: 2021-11-10

## 2021-11-09 RX ORDER — INSULIN LISPRO 100/ML
20 VIAL (ML) SUBCUTANEOUS
Refills: 0 | Status: DISCONTINUED | OUTPATIENT
Start: 2021-11-09 | End: 2021-11-09

## 2021-11-09 RX ORDER — INSULIN LISPRO 100/ML
VIAL (ML) SUBCUTANEOUS AT BEDTIME
Refills: 0 | Status: DISCONTINUED | OUTPATIENT
Start: 2021-11-09 | End: 2021-11-12

## 2021-11-09 RX ADMIN — ESCITALOPRAM OXALATE 20 MILLIGRAM(S): 10 TABLET, FILM COATED ORAL at 11:59

## 2021-11-09 RX ADMIN — Medication 2: at 09:00

## 2021-11-09 RX ADMIN — SODIUM CHLORIDE 50 MILLILITER(S): 9 INJECTION, SOLUTION INTRAVENOUS at 05:53

## 2021-11-09 RX ADMIN — Medication 2: at 11:56

## 2021-11-09 RX ADMIN — SODIUM CHLORIDE 3 MILLILITER(S): 9 INJECTION INTRAMUSCULAR; INTRAVENOUS; SUBCUTANEOUS at 12:19

## 2021-11-09 RX ADMIN — INSULIN GLARGINE 75 UNIT(S): 100 INJECTION, SOLUTION SUBCUTANEOUS at 22:28

## 2021-11-09 RX ADMIN — Medication 200 MICROGRAM(S): at 05:54

## 2021-11-09 RX ADMIN — Medication 20 UNIT(S): at 11:56

## 2021-11-09 RX ADMIN — Medication 25 MICROGRAM(S): at 05:54

## 2021-11-09 RX ADMIN — LOSARTAN POTASSIUM 100 MILLIGRAM(S): 100 TABLET, FILM COATED ORAL at 05:54

## 2021-11-09 RX ADMIN — SODIUM CHLORIDE 3 MILLILITER(S): 9 INJECTION INTRAMUSCULAR; INTRAVENOUS; SUBCUTANEOUS at 21:59

## 2021-11-09 RX ADMIN — Medication 20 UNIT(S): at 09:00

## 2021-11-09 RX ADMIN — SODIUM CHLORIDE 3 MILLILITER(S): 9 INJECTION INTRAMUSCULAR; INTRAVENOUS; SUBCUTANEOUS at 05:48

## 2021-11-09 NOTE — PROGRESS NOTE ADULT - ASSESSMENT
ASSESSMENT:  56y Female with PMH of morbid obesity, ANGLE (not on CPAP at home), HTN, HLD, fatty liver, depression and anxiety, here now s/p total thryoidectomy on 11/8 for thyroid nodule which showed atypical cells. SICU consulted for hyperglycemia throughout the case, s/p multiple insulin boluses and started on insulin infusion intraop, which was continued post op.       PLAN:   #Neurologic:   - awake and alert  - Tylenol PRN for pain    #Respiratory:   - satting mid 90s on supplemental O2  - not on home O2 prior to surgery, would titrate down once the patient becomes more awake  - f/u CXR    #Cardiovascular:   - h/o HTN, HLD  - Hypertensive, received 1x 10mg labetalol IVP in unit  - c/w home losartan  - maintain MAP >65    #Gastrointestinal/Nutrition:   - carbohydrate controlled diet     #Renal/Genitourinary:   - voiding spontaneously  - monitor I's and O's    #Hematologic:   - No active issues  - SCDs for DVT ppx    #Infectious Disease:   - monitor for fevers  - no abx needed    #Endocrine:   - h/o DM at home, A1c 8.5, on oral agents at home  - insulin pushes x2 in OR, then placed on insulin infusion (now at 5u/hour)  - TDD = 212U  - transitioned off of insulin gtt w/ 75U Lantus and 20U Admelog w/ meals  - moderate ISS given TDD>0.5U/kg  - FS Q2 w/ goal 140-180  - c/w synthroid   - endocrine consulted for poorly managed DM    Lines/Tubes:  - PIV  - alesha    Disposition: SICU  --------------------------------------------------------------------------------------    Critical Care Diagnoses:  - hyperglycemia requiring insulin infusion     ASSESSMENT:  56y Female with PMH of morbid obesity, ANGLE (not on CPAP at home), HTN, HLD, fatty liver, depression and anxiety, here now s/p total thryoidectomy on 11/8 for thyroid nodule which showed atypical cells. SICU consulted for hyperglycemia throughout the case, s/p multiple insulin boluses and started on insulin infusion intraop, which was continued post op.       PLAN:   #Neurologic:   - awake and alert  - Tylenol PRN for pain    #Respiratory:   - satting mid 90s on NC  - not on home O2 prior to surgery, would titrate down once the patient becomes more awake  - f/u CXR    #Cardiovascular:   - h/o HTN, HLD  - Hypertensive, received 1x 10mg labetalol IVP in unit  - c/w home losartan  - maintain MAP >65    #Gastrointestinal/Nutrition:   - carbohydrate controlled diet     #Renal/Genitourinary:   - voiding spontaneously  - monitor I's and O's    #Hematologic:   - No active issues  - SCDs for DVT ppx    #Infectious Disease:   - monitor for fevers  - no abx needed    #Endocrine:   - h/o DM at home, A1c 8.5, on oral agents at home  - insulin pushes x2 in OR, then placed on insulin infusion (now at 5u/hour)  - TDD = 212U  - transitioned off of insulin gtt w/ 75U Lantus and 20U Admelog w/ meals  - moderate ISS given TDD>0.5U/kg  - FS Q2 w/ goal 140-180  - c/w synthroid   - endocrine consulted for poorly managed DM    Lines/Tubes:  - BARI powell/ezequiel    Disposition: Listed for floor  --------------------------------------------------------------------------------------    Critical Care Diagnoses:  - hyperglycemia requiring insulin infusion

## 2021-11-09 NOTE — DISCHARGE NOTE PROVIDER - NSDCCPCAREPLAN_GEN_ALL_CORE_FT
PRINCIPAL DISCHARGE DIAGNOSIS  Diagnosis: Thyroid nodule  Assessment and Plan of Treatment:        PRINCIPAL DISCHARGE DIAGNOSIS  Diagnosis: Thyroid nodule  Assessment and Plan of Treatment: - Please follow up with Dr. Juarez outpatient as scheduled

## 2021-11-09 NOTE — DISCHARGE NOTE PROVIDER - CARE PROVIDER_API CALL
Daniel Juarez)  Otolaryngology  [address] [place] [address]  Phone: [phone]  Fax: [phone]  Follow Up Time:

## 2021-11-09 NOTE — DISCHARGE NOTE PROVIDER - NSDCFUSCHEDAPPT_GEN_ALL_CORE_FT
ALLI ARANGO ; 12/29/2021 ; NPP Puled [address] [place] [address] ALLI ARANGO ; 11/22/2021 ; [facility] [address] [facility]  ALLI ARANGO ; 12/29/2021 ; [facility] ALLI Trevino ; 01/11/2022 ; [facility] [address] [facility]

## 2021-11-09 NOTE — DISCHARGE NOTE PROVIDER - NSDCMRMEDTOKEN_GEN_ALL_CORE_FT
aspirin 81 mg oral tablet: orally once a day. last dose 11/1/21  escitalopram 20 mg oral tablet: 1 tab(s) orally once a day in AM   glipiZIDE 10 mg oral tablet: orally 2 times a day  losartan 100 mg oral tablet: 1 tab(s) orally once a day in AM   metFORMIN 1000 mg oral tablet: 1 tab(s) orally 2 times a day  rosuvastatin 20 mg oral tablet: 1 tab(s) orally once a day (at bedtime)  Vitamin D3 125 mcg (5000 intl units) oral tablet: 1 tab(s) orally once a day   acetaminophen 325 mg oral tablet: 2 tab(s) orally every 6 hours, As needed, Mild Pain (1 - 3)  Admelog 100 units/mL injectable solution: 9 unit(s) injectable 3 times a day   Admelog 100 units/mL injectable solution: 9 unit(s) subcutaneous 3 times a day (with meals)   aspirin 81 mg oral tablet: orally once a day. last dose 11/1/21  escitalopram 20 mg oral tablet: 1 tab(s) orally once a day in AM   glucometer (per patient&#x27;s insurance): Test blood sugars four times a day. Dispense #1 glucometer.  insulin glargine: 27 unit(s) injectable prn   lancets: 1 application subcutaneously 4 times a day   Lantus Solostar Pen 100 units/mL subcutaneous solution: 27 unit(s) subcutaneous once a day   levothyroxine 200 mcg (0.2 mg) oral tablet: 1 tab(s) orally once a day  losartan 100 mg oral tablet: 1 tab(s) orally once a day in AM   rosuvastatin 20 mg oral tablet: 1 tab(s) orally once a day (at bedtime)  Vitamin D3 125 mcg (5000 intl units) oral tablet: 1 tab(s) orally once a day

## 2021-11-09 NOTE — PROGRESS NOTE ADULT - SUBJECTIVE AND OBJECTIVE BOX
MD paged the following:    Critical lab result for bili total:  Results   11/8/2020 07:24  Bilirubin Total: 24.8 (HH)  Alkaline Phosphatase: 171 (H)  ALT: 164 (H)  AST: 180 (H)  Bilirubin Direct: 21.1 (H)    Thanks!    Krystina nAgel RN on 11/8/2020 at 10:54 AM     SICU Daily Progress Note  =====================================================  Interval/Overnight Events:       - insulin drip titrated down  - -110  - TDD @ 212U  - 75U Glargine, 2hr infusion coverage, 20U Lispo w/ meals      HPI:  57 y/o female with Type 2 DM, HTN, HLD, Mild ANGLE (not on CPAP), Morbid Obesity, Fatty liver, depression and anxiety presents to PST preop for total thyroidectomy. pt diagnosed with thyroid nodule 2 years ago. pt had an ultrasound of the neck which showed two right lobe nodules.  s/p biopsy on 21. pt states one nodule showed atypical cells and "90% risk of cancer".       PMH:  PAST MEDICAL & SURGICAL HISTORY:  Morbid obesity    HTN (hypertension)    HLD (hyperlipidemia)    Type 2 diabetes mellitus    Anxiety and depression    Fatty liver    Bilateral carpal tunnel syndrome    OA (osteoarthritis)    Bulging lumbar disc    Right tennis elbow  and left elbow    Left shoulder pain    Thyroid nodule    Malignant neoplasm of thyroid gland    ANGLE (obstructive sleep apnea)  mild ANGLE    H/O  section   and     History of laparoscopic adjustable gastric banding   and removed in         ALLERGIES:  No Known Allergies      --------------------------------------------------------------------------------------    MEDICATIONS:    Neurologic Medications  acetaminophen     Tablet .. 650 milliGRAM(s) Oral every 6 hours PRN Mild Pain (1 - 3)  escitalopram 20 milliGRAM(s) Oral daily  oxyCODONE    IR 5 milliGRAM(s) Oral every 6 hours PRN Moderate Pain (4 - 6)  oxyCODONE    IR 10 milliGRAM(s) Oral every 6 hours PRN Severe Pain (7 - 10)    Respiratory Medications    Cardiovascular Medications  losartan 100 milliGRAM(s) Oral daily    Gastrointestinal Medications  dextrose 5% + sodium chloride 0.45%. 1000 milliLiter(s) IV Continuous <Continuous>  dextrose 5%. 1000 milliLiter(s) IV Continuous <Continuous>  dextrose 5%. 1000 milliLiter(s) IV Continuous <Continuous>  famotidine Injectable 20 milliGRAM(s) IV Push once PRN nausea/vomiting  sodium chloride 0.9% lock flush 3 milliLiter(s) IV Push every 8 hours    Genitourinary Medications    Hematologic/Oncologic Medications  influenza   Vaccine 0.5 milliLiter(s) IntraMuscular once    Antimicrobial/Immunologic Medications    Endocrine/Metabolic Medications  dextrose 40% Gel 15 Gram(s) Oral once  dextrose 50% Injectable 50 milliLiter(s) IV Push every 15 minutes  dextrose 50% Injectable 25 milliLiter(s) IV Push every 15 minutes  glucagon  Injectable 1 milliGRAM(s) IntraMuscular once  insulin glargine Injectable (LANTUS) 75 Unit(s) SubCutaneous at bedtime  insulin lispro Injectable (ADMELOG) 20 Unit(s) SubCutaneous every 8 hours  insulin regular Infusion 10.5 Unit(s)/Hr IV Continuous <Continuous>  levothyroxine 200 MICROGram(s) Oral daily  levothyroxine 25 MICROGram(s) Oral daily    Topical/Other Medications    --------------------------------------------------------------------------------------    VITAL SIGNS:  ICU Vital Signs Last 24 Hrs  T(C): 36.4 (2021 00:00), Max: 37 (2021 20:00)  T(F): 97.6 (2021 00:00), Max: 98.6 (2021 20:00)  HR: 70 (2021 00:00) (70 - 82)  BP: 157/55 (2021 18:00) (136/65 - 157/55)  BP(mean): 78 (2021 18:00) (78 - 98)  ABP: 139/66 (2021 00:00) (132/59 - 186/83)  ABP(mean): 91 (2021 00:00) (85 - 129)  RR: 17 (2021 00:00) (12 - 25)  SpO2: 97% (2021 00:00) (90% - 100%)    --------------------------------------------------------------------------------------    INS AND OUTS:  I&O's Summary    2021 07:01  -  2021 00:46  --------------------------------------------------------  IN: 592 mL / OUT: 1685 mL / NET: -1093 mL      --------------------------------------------------------------------------------------          EXAM  General/Neuro  RASS:   GCS:   Exam:   -awake and alert, able to answer questions appropriately   -incision site c/d/i from thyroidectomy    Respiratory  Exam: CTA  h/o ANGLE  [] Tracheostomy   [] Intubated  Mechanical Ventilation:     Cardiovascular  Exam: RRR  Cardiac Rhythm: Normal Sinus Rhythm  ECHO:     GI  Exam: Abdomen, soft, NT, ND  Current Diet:  Consistent Carb Diet    Extremities  Exam: Extremities warm, pink, well-perfused.        Derm:  Exam: Good skin turgor, no skin breakdown.        METABOLIC / FLUIDS / ELECTROLYTES  dextrose 5% + sodium chloride 0.45%. 1000 milliLiter(s) IV Continuous <Continuous>  dextrose 5%. 1000 milliLiter(s) IV Continuous <Continuous>  dextrose 5%. 1000 milliLiter(s) IV Continuous <Continuous>  sodium chloride 0.9% lock flush 3 milliLiter(s) IV Push every 8 hours      HEMATOLOGIC  [x] VTE Prophylaxis:   Transfusions:	[] PRBC	[] Platelets		[] FFP	[] Cryoprecipitate    INFECTIOUS DISEASE  Antimicrobials/Immunologic Medications:  influenza   Vaccine 0.5 milliLiter(s) IntraMuscular once      TUBES / LINES / DRAINS  [x] Peripheral IV  [] Central Venous Line     	[] R	[] L	[] IJ	[] Fem	[] SC	Date Placed:   [x] Arterial Line		[] R	[] L	[] Fem	[] Rad	[] Ax	Date Placed:   [] PICC		[] Midline		[] Mediport  [] Urinary Catheter		Date Placed:   [x] Necessity of urinary, arterial, and venous catheters discussed    --------------------------------------------------------------------------------------    LABS                                              12.6                  Neurophils% (auto):   x      ( @ 14:21):    12.77)-----------(300          Lymphocytes% (auto):  x                                             40.4                   Eosinphils% (auto):   x        Manual%: Neutrophils x    ; Lymphocytes x    ; Eosinophils x    ; Bands%: x    ; Blasts x              141  |  101  |  14  ----------------------------<  281<H>  4.1   |  25  |  0.84    Ca    9.1      2021 21:17  Phos  4.2       Mg     1.90             ABG - ( 2021 13:54 )  pH: 7.32  /  pCO2: 53    /  pO2: 71    / HCO3: 27    / Base Excess: 0.3   /  SaO2: 94.0  / Lactate: x          RECENT CULTURES:      --------------------------------------------------------------------------------------    OTHER LABORATORY:     IMAGING STUDIES:

## 2021-11-09 NOTE — DISCHARGE NOTE PROVIDER - HOSPITAL COURSE
S/P total thyroidectomy, Observed in SICU due to 02 requirements. Patient was titrated off face mask and cleared for transfer to [address] [place] [address]. S/P total thyroidectomy, Observed in SICU due to 02 requirements and glucose control on Insulin drip post op.  Patient was titrated off face mask and cleared for transfer to [address] [place] [address]. Endocrinology consult was requested bu ENT via SICU and was called in officially by ENT after patient was transferred to [address] [place] [address] for discharge Diabetic recs. Endocrinology eval was done on 11/10/21 it it was recommended patient be started on pre meal insulin and lantus qhs. It was also recommended patient be monitored until blood sugar was under full control.     11/11/21: 57 y/o F with thyroid nodule S/P total thyroidectomy, observed in SICU due to O2 requirements and glucose control on insulin drip post op. Patient was titrated off face mask and cleared for transfer to [address] [place] [address]. Calcium and PTH levels are monitored post-operatively, stable, started on synthroid. Endocrinology consult was requested by ENT via SICU and was called in officially by ENT after patient was transferred to [address] [place] [address] for discharge diabetic recs. Endocrinology eval was done on 11/10/21, it was recommended patient be started on pre meal insulin and lantus qhs. It was also recommended patient be monitored until blood sugar was under full control. Nutrition evaluated patient and cleared for discharge on low sodium; consistent carbohydrate (evening snack) diet. Patient's glucose were stable on current regiment. Diabetic education was done by RN. Patient was cleared for discharge home by Dr. Juarez on 11/12/2021. All prescriptions were sent to a pharmacy that was agreed on with the patient.  ... year old male/female with thyroid nodules S/P total thyroidectomy. Calcium and PTH levels are monitored post-operatively, stable. Tolerating regular PO diet, pain is controlled. Patient was cleared for discharge home By Dr. Grimes.. on .... All prescriptions were sent to a pharmacy that was agreed on with the patient.

## 2021-11-09 NOTE — DISCHARGE NOTE PROVIDER - NSDCFUADDINST_GEN_ALL_CORE_FT
At home, patient should check FSBG premeals and bedtime. Pt should call their doctor when FSBG <70 or above >400 and or consistently above 200s as changes in the regimen will have to be made.

## 2021-11-09 NOTE — PROGRESS NOTE ADULT - SUBJECTIVE AND OBJECTIVE BOX
s/p TT, parathyroid reimplantation on 11/8, admitted to SICU for hyperglycemia. Now sugars under control. doing well.  PTH/Ca levels wnl. no perioral numbness or tingling.     ICU Vital Signs Last 24 Hrs  T(C): 36.1 (09 Nov 2021 04:00), Max: 37 (08 Nov 2021 20:00)  T(F): 96.9 (09 Nov 2021 04:00), Max: 98.6 (08 Nov 2021 20:00)  HR: 65 (09 Nov 2021 07:00) (65 - 82)  BP: 157/55 (08 Nov 2021 18:00) (139/66 - 157/55)  BP(mean): 78 (08 Nov 2021 18:00) (78 - 98)  ABP: 147/72 (09 Nov 2021 07:00) (132/59 - 186/83)  ABP(mean): 97 (09 Nov 2021 07:00) (85 - 129)  RR: 14 (09 Nov 2021 07:00) (12 - 25)  SpO2: 97% (09 Nov 2021 07:00) (90% - 100%)  NAD  neck soft and flat  CHERYLE with ss output, 95cc/24hrs    A/P: 56y F s/p TT, parathyroid reimplantation on 11/8. doing well.    - f/u endo recs  - okay for floor  - will stay admitted for high drain output, patient not comfortable going home with drain  - call or page with questions or concerns

## 2021-11-09 NOTE — DISCHARGE NOTE PROVIDER - NSDCQMAMI_CARD_ALL_CORE
[facility] ENT called regarding referral for patient. Their office states they no longer have a provider that addresses this issue. No

## 2021-11-10 DIAGNOSIS — I10 ESSENTIAL (PRIMARY) HYPERTENSION: ICD-10-CM

## 2021-11-10 DIAGNOSIS — E04.1 NONTOXIC SINGLE THYROID NODULE: ICD-10-CM

## 2021-11-10 DIAGNOSIS — E11.9 TYPE 2 DIABETES MELLITUS WITHOUT COMPLICATIONS: ICD-10-CM

## 2021-11-10 DIAGNOSIS — E78.5 HYPERLIPIDEMIA, UNSPECIFIED: ICD-10-CM

## 2021-11-10 PROBLEM — K76.0 FATTY (CHANGE OF) LIVER, NOT ELSEWHERE CLASSIFIED: Chronic | Status: ACTIVE | Noted: 2021-10-25

## 2021-11-10 PROBLEM — G56.03 CARPAL TUNNEL SYNDROME, BILATERAL UPPER LIMBS: Chronic | Status: ACTIVE | Noted: 2021-10-25

## 2021-11-10 PROBLEM — M77.11 LATERAL EPICONDYLITIS, RIGHT ELBOW: Chronic | Status: ACTIVE | Noted: 2021-10-25

## 2021-11-10 PROBLEM — F41.9 ANXIETY DISORDER, UNSPECIFIED: Chronic | Status: ACTIVE | Noted: 2021-10-25

## 2021-11-10 PROBLEM — C73 MALIGNANT NEOPLASM OF THYROID GLAND: Chronic | Status: ACTIVE | Noted: 2021-10-25

## 2021-11-10 PROBLEM — M25.512 PAIN IN LEFT SHOULDER: Chronic | Status: ACTIVE | Noted: 2021-10-25

## 2021-11-10 PROBLEM — M19.90 UNSPECIFIED OSTEOARTHRITIS, UNSPECIFIED SITE: Chronic | Status: ACTIVE | Noted: 2021-10-25

## 2021-11-10 PROBLEM — M51.26 OTHER INTERVERTEBRAL DISC DISPLACEMENT, LUMBAR REGION: Chronic | Status: ACTIVE | Noted: 2021-10-25

## 2021-11-10 PROBLEM — G47.33 OBSTRUCTIVE SLEEP APNEA (ADULT) (PEDIATRIC): Chronic | Status: ACTIVE | Noted: 2021-10-25

## 2021-11-10 PROBLEM — E66.01 MORBID (SEVERE) OBESITY DUE TO EXCESS CALORIES: Chronic | Status: ACTIVE | Noted: 2021-10-25

## 2021-11-10 LAB
ANION GAP SERPL CALC-SCNC: 13 MMOL/L — SIGNIFICANT CHANGE UP (ref 7–14)
BUN SERPL-MCNC: 18 MG/DL — SIGNIFICANT CHANGE UP (ref 7–23)
CALCIUM SERPL-MCNC: 8.5 MG/DL — SIGNIFICANT CHANGE UP (ref 8.4–10.5)
CHLORIDE SERPL-SCNC: 103 MMOL/L — SIGNIFICANT CHANGE UP (ref 98–107)
CO2 SERPL-SCNC: 27 MMOL/L — SIGNIFICANT CHANGE UP (ref 22–31)
CREAT SERPL-MCNC: 0.81 MG/DL — SIGNIFICANT CHANGE UP (ref 0.5–1.3)
GLUCOSE BLDC GLUCOMTR-MCNC: 113 MG/DL — HIGH (ref 70–99)
GLUCOSE BLDC GLUCOMTR-MCNC: 130 MG/DL — HIGH (ref 70–99)
GLUCOSE BLDC GLUCOMTR-MCNC: 168 MG/DL — HIGH (ref 70–99)
GLUCOSE BLDC GLUCOMTR-MCNC: 78 MG/DL — SIGNIFICANT CHANGE UP (ref 70–99)
GLUCOSE SERPL-MCNC: 134 MG/DL — HIGH (ref 70–99)
HCT VFR BLD CALC: 37.3 % — SIGNIFICANT CHANGE UP (ref 34.5–45)
HGB BLD-MCNC: 11.3 G/DL — LOW (ref 11.5–15.5)
MAGNESIUM SERPL-MCNC: 1.8 MG/DL — SIGNIFICANT CHANGE UP (ref 1.6–2.6)
MCHC RBC-ENTMCNC: 28.2 PG — SIGNIFICANT CHANGE UP (ref 27–34)
MCHC RBC-ENTMCNC: 30.3 GM/DL — LOW (ref 32–36)
MCV RBC AUTO: 93 FL — SIGNIFICANT CHANGE UP (ref 80–100)
NRBC # BLD: 0 /100 WBCS — SIGNIFICANT CHANGE UP
NRBC # FLD: 0 K/UL — SIGNIFICANT CHANGE UP
PHOSPHATE SERPL-MCNC: 4.6 MG/DL — HIGH (ref 2.5–4.5)
PLATELET # BLD AUTO: 243 K/UL — SIGNIFICANT CHANGE UP (ref 150–400)
POTASSIUM SERPL-MCNC: 4.2 MMOL/L — SIGNIFICANT CHANGE UP (ref 3.5–5.3)
POTASSIUM SERPL-SCNC: 4.2 MMOL/L — SIGNIFICANT CHANGE UP (ref 3.5–5.3)
RBC # BLD: 4.01 M/UL — SIGNIFICANT CHANGE UP (ref 3.8–5.2)
RBC # FLD: 14.1 % — SIGNIFICANT CHANGE UP (ref 10.3–14.5)
SODIUM SERPL-SCNC: 143 MMOL/L — SIGNIFICANT CHANGE UP (ref 135–145)
WBC # BLD: 8.25 K/UL — SIGNIFICANT CHANGE UP (ref 3.8–10.5)
WBC # FLD AUTO: 8.25 K/UL — SIGNIFICANT CHANGE UP (ref 3.8–10.5)

## 2021-11-10 PROCEDURE — 99232 SBSQ HOSP IP/OBS MODERATE 35: CPT

## 2021-11-10 PROCEDURE — 99222 1ST HOSP IP/OBS MODERATE 55: CPT

## 2021-11-10 RX ORDER — INSULIN GLARGINE 100 [IU]/ML
27 INJECTION, SOLUTION SUBCUTANEOUS AT BEDTIME
Refills: 0 | Status: DISCONTINUED | OUTPATIENT
Start: 2021-11-10 | End: 2021-11-12

## 2021-11-10 RX ORDER — INSULIN LISPRO 100/ML
9 VIAL (ML) SUBCUTANEOUS
Refills: 0 | Status: DISCONTINUED | OUTPATIENT
Start: 2021-11-10 | End: 2021-11-12

## 2021-11-10 RX ORDER — INSULIN LISPRO 100/ML
15 VIAL (ML) SUBCUTANEOUS
Refills: 0 | Status: DISCONTINUED | OUTPATIENT
Start: 2021-11-10 | End: 2021-11-10

## 2021-11-10 RX ORDER — INSULIN GLARGINE 100 [IU]/ML
60 INJECTION, SOLUTION SUBCUTANEOUS AT BEDTIME
Refills: 0 | Status: DISCONTINUED | OUTPATIENT
Start: 2021-11-10 | End: 2021-11-10

## 2021-11-10 RX ORDER — POLYETHYLENE GLYCOL 3350 17 G/17G
17 POWDER, FOR SOLUTION ORAL DAILY
Refills: 0 | Status: DISCONTINUED | OUTPATIENT
Start: 2021-11-10 | End: 2021-11-12

## 2021-11-10 RX ORDER — POLYETHYLENE GLYCOL 3350 17 G/17G
17 POWDER, FOR SOLUTION ORAL ONCE
Refills: 0 | Status: COMPLETED | OUTPATIENT
Start: 2021-11-10 | End: 2021-11-10

## 2021-11-10 RX ADMIN — SODIUM CHLORIDE 3 MILLILITER(S): 9 INJECTION INTRAMUSCULAR; INTRAVENOUS; SUBCUTANEOUS at 14:38

## 2021-11-10 RX ADMIN — SODIUM CHLORIDE 3 MILLILITER(S): 9 INJECTION INTRAMUSCULAR; INTRAVENOUS; SUBCUTANEOUS at 06:40

## 2021-11-10 RX ADMIN — POLYETHYLENE GLYCOL 3350 17 GRAM(S): 17 POWDER, FOR SOLUTION ORAL at 08:06

## 2021-11-10 RX ADMIN — Medication 200 MICROGRAM(S): at 05:28

## 2021-11-10 RX ADMIN — LOSARTAN POTASSIUM 100 MILLIGRAM(S): 100 TABLET, FILM COATED ORAL at 05:28

## 2021-11-10 RX ADMIN — ESCITALOPRAM OXALATE 20 MILLIGRAM(S): 10 TABLET, FILM COATED ORAL at 11:09

## 2021-11-10 RX ADMIN — INSULIN GLARGINE 27 UNIT(S): 100 INJECTION, SOLUTION SUBCUTANEOUS at 22:00

## 2021-11-10 RX ADMIN — Medication 2: at 10:52

## 2021-11-10 RX ADMIN — Medication 25 MICROGRAM(S): at 05:29

## 2021-11-10 RX ADMIN — SODIUM CHLORIDE 3 MILLILITER(S): 9 INJECTION INTRAMUSCULAR; INTRAVENOUS; SUBCUTANEOUS at 21:22

## 2021-11-10 RX ADMIN — Medication 15 UNIT(S): at 17:35

## 2021-11-10 NOTE — CONSULT NOTE ADULT - ASSESSMENT
ASSESSMENT:  56y Female with PMH of morbid obesity, ANGLE (not on CPAP at home), HTN, HLD, fatty liver, depression and anxiety, here now s/p total thryoidectomy on 11/8 for thyroid nodule which showed atypical cells. SICU consulted for hyperglycemia throughout the case, s/p multiple insulin boluses and started on insulin infusion intraop, which was continued post op.     PLAN:   #Neurologic:   - awake and alert, but still sluggish from anesthesia  - pain control with Tylenol PRN  - would limit amount of opioids considering morbid obesity and ANGLE    #Respiratory:   - satting mid 90s with face mask   - not on home O2 prior to surgery, would titrate down once the patient becomes more awake  - CXR  - maintain SpO2 >92%    #Cardiovascular:   - h/o HTN, HLD  - patient's SBP prior to entering or was in the 150s  - on losartan and rosuvastatin at home, can resume for blood pressure control  - maintain MAP >65    #Gastrointestinal/Nutrition:   - carbohydrate controlled diet   -    #Renal/Genitourinary:   - voiding spontaneously  - monitor I's and O's    #Hematologic:   - obtain labs  - SCDs for DVT ppx    #Infectious Disease:   - monitor for fevers  - no abx needed    Lines/Tubes:  - PIV    #Endocrine:   - h/o DM at home, A1c 8.5, on oral agents at home  - insulin pushes x2 in OR, then placed on insulin infusion (now at 5u/hour)  - maintain on insulin infusion per protocol  - keep FS between 140-180  - endocrine consult for poorly managed DM      Disposition:   - to discuss with attending  --------------------------------------------------------------------------------------    Critical Care Diagnoses:    
A/P: 57 yo F with PMH of type 2 DM, HTN, HLD, ANGLE, obesity, depression, presenting with for total thyroidectomy. Endocrinology was consulted for management of diabetes mellitus.    #Uncontrolled Type 2 Diabetes Mellitus  - A1c 8.5; on metformin 1000 mg bid + glipizide 10 mg daily at home. Requiring high insulin doses in OR/post op possibly due to surgical stress/hospitalization, will likely see downtrend in insulin requirements  - due to very high insulin needs and need for good wound healing will discharge patient on insulin, but will plan for weight based dosing for discharge  - Recommend lantus 60 units QHS  - Recommend admelog 15 units with meals  - Recommend moderate lispro correction scale TIDQAC and QHS  - Please check FSG before meals and QHS, or q6h while NPO  - Please obtain nutrition consult  - Patient will need insulin teaching prior to discharge    DISCHARGE PLANNING:  - Make sure patient knows how to inject insulin and check fingersticks with glucometer (ask bedside RN for teaching; provider to RN order placed)  - Discharge on basal insulin (lantus/glargine) and pre-meal insulin (admelog/lispro). Do not discharge on lispro correction scale or bedtime scale.  - At home, Patient should check FSBG premeals and bedtime. Pt should call their doctor when FSBG <70 or above >400 and or consistently above 200s as changes in the regimen will have to be made.  -Advised that pt should call PMD for DM related questions or concerns until pt is seen by CDE or Endocrine team.  - Will likely discharge on weight-based insulin + metformin (STOP glipizide). Please confirm with endo prior to discharge.    DISCHARGE RX:  - Glucometer (ACCU_CHECK jose Connect, Ascensia Contour Next EZ or One, Freestyle [place] LITE or OneTouch Verio IQ)  - Glucometer test strips and lancets  (make sure compatible with glucometer): Dispense #100 (or #200), use as directed (3 refills)  - Lantus Solostar Pen (or Basaglar Kwikpen): Inject 35 units sc before bedtime, dispense 5 pens (3 refills)   - Novolog Flexpen (or Humalog Kwikpen): Inject 12 units sc before meals, dispense 5 pens (3 refills)  - Metformin 1000 mg BID; prescribe 60 tabs, 3 refills  - BD kristy 4mm pen needles: dispense #100, use as directed (3 refills)  - Alcohol pads: dispense #100, use as directed (3 refills)    Please provide down-titration instructions if patient experiences glucoses <80 upon discharge:  reduce lantus to 27 units and admelog to 9 units    Will arrange appt. Please provide contact info as pt wants to transfer care to [facility]:  [facility]:  [address] [place] [address]. [address]. [place] [address]. Tel: [phone]    #Hypertension  - on ARB  - BP goal <130/80  - Management as per primary team    #Hyperlipidemia  - check fasting lipid panel as outpatient  - rosuvastatin 20 mg (home med)    #Thyroid nodules s/p total thyroidectomy  - continue levothyroxine 225 mcg (wt based dose)  - follow up path as outpatient  - please check baseline TSH, FT4  - PTH, calcium normal    Discussed with primary team and placed insulin orders as above    Latanya Barraza MD  Attending Physician   Department of Endocrinology, Diabetes and Metabolism   Pager: [phone]    If before 9AM or after 5PM, or on weekends/holidays, please call the Endocrine answering service for assistance ([phone]).  For nonurgent matters, please email [email or web address] for assistance.

## 2021-11-10 NOTE — PROGRESS NOTE ADULT - SUBJECTIVE AND OBJECTIVE BOX
s/p TT, parathyroid reimplantation on 11/8, admitted to SICU for hyperglycemia.   Sugars under control. doing well.  PTH/Ca levels wnl. no perioral numbness or tingling. incision healing well. pending floor bed.    Vital Signs Last 24 Hrs  T(C): 37.3 (10 Nov 2021 04:00), Max: 37.3 (09 Nov 2021 16:00)  T(F): 99.1 (10 Nov 2021 04:00), Max: 99.1 (09 Nov 2021 16:00)  HR: 66 (10 Nov 2021 04:00) (64 - 80)  BP: 138/64 (10 Nov 2021 04:00) (138/64 - 145/56)  BP(mean): 82 (10 Nov 2021 04:00) (78 - 82)  RR: 16 (10 Nov 2021 04:00) (15 - 26)  SpO2: 98% (10 Nov 2021 04:00) (95% - 99%)  NAD  neck soft and flat  CHERYLE with ss output    A/P: 56y F s/p TT, parathyroid reimplantation on 11/8. doing well.    - f/u endo recs  - pending floor bed  - will stay admitted for high drain output, patient not comfortable going home with drain  - call or page with questions or concerns

## 2021-11-10 NOTE — PROGRESS NOTE ADULT - ASSESSMENT
ASSESSMENT:  56y Female with PMH of morbid obesity, ANGLE (not on CPAP at home), HTN, HLD, fatty liver, depression and anxiety, here now s/p total thryoidectomy on 11/8 for thyroid nodule which showed atypical cells. SICU consulted for hyperglycemia throughout the case, s/p multiple insulin boluses and started on insulin infusion intraop, which was continued post op.       PLAN:   #Neurologic:   - awake and alert  - Tylenol PRN for pain    #Respiratory:   - satting mid 90s on NC  - not on home O2 prior to surgery, would titrate down once the patient becomes more awake  - f/u CXR    #Cardiovascular:   - h/o HTN, HLD  - Hypertensive, received 1x 10mg labetalol IVP in unit  - c/w home losartan  - maintain MAP >65    #Gastrointestinal/Nutrition:   - carbohydrate controlled diet     #Renal/Genitourinary:   - voiding spontaneously  - monitor I's and O's    #Hematologic:   - No active issues  - SCDs for DVT ppx    #Infectious Disease:   - monitor for fevers  - no abx needed    #Endocrine:   - h/o DM at home, A1c 8.5, on oral agents at home  - insulin pushes x2 in OR, then placed on insulin infusion (now at 5u/hour)  - TDD = 212U  - transitioned off of insulin gtt w/ 75U Lantus and 20U Admelog w/ meals  - moderate ISS given TDD>0.5U/kg  - FS Q2 w/ goal 140-180  - c/w synthroid   - endocrine consulted for poorly managed DM    Lines/Tubes:  - BARI powell/ezequiel    Disposition: Listed for floor  --------------------------------------------------------------------------------------    Critical Care Diagnoses:  - hyperglycemia requiring insulin infusion

## 2021-11-10 NOTE — PROGRESS NOTE ADULT - ATTENDING COMMENTS
Patient s/p total thyroidectomy, doing well, off insulin gtt, maintained on reg diet. Plan floor eligible    I have personally interviewed when possible and examined the patient, reviewed data and laboratory tests/x-rays and all pertinent electronic images.  I was physically present for the key portions of the evaluation and management (E/M) service provided.   The SICU team has a constant risk benefit analyzes discussion with the primary team, all consultants, House Staff and PA's on all decisions.  These diagnoses are unrelated to the surgical procedure noted above.  I meet with family if needed to get further history, discuss the case and make care decisions for this patient who might not be able to participate.  Time involved in performance of separately billable procedures was not counted toward my critical care time. There is no overlap.  I spent 30 minutes ( 0800Hrs-0915Hrs in AM/ 1600Hrs-1715Hrs in PM, or other time indicated) of critical care time for the diagnoses, assessment, plan and interventions.  This time excludes time spent on separate procedures and teaching.
I agree with the above detailed interval history, physical, and plan, which I have reviewed and edited where appropriate; also agree with notes/assessment and of the team on service.  I have personally examined the patient, reviewed all data.  The plan is specified below:  The patient is in SICU with diagnosis mentioned in the note.    The SICU team has a constant risk benefit analyzes discussion and coordinating care with the primary team, all consultants, House Staff and PA's.  I was physically present for the key portions of the evaluation and management (E/M) service provided.  56y Female s/p total thryoidectomy in SICU for hyperglycemia   awake and alert,  Respiratory  Exam: clear  Cardiovascular  Exam: RRR  GI  Exam: Abdomen, soft, NT, ND  Extremities  Exam: Extremities warm,    PLAN:   Neurologic:    Tylenol PRN for pain  Respiratory:   - wean FiO2   Cardiovascular:    losartan  -Gastrointestinal/Nutrition:   - carbohydrate controlled diet   Renal/Genitourinary:    monitor I's and O's  Hematologic:   SCDs for DVT ppx  Infectious Disease:   - no abx needed  Endocrine:   -- c/w synthroid   Disposition: Listed for floor    Critical Care Diagnoses:  - hyperglycemia requiring insulin infusion

## 2021-11-10 NOTE — CONSULT NOTE ADULT - SUBJECTIVE AND OBJECTIVE BOX
SICU Consultation Note  =====================================================  HPI: 56y Female  HPI:  55 y/o female with Type 2 DM, HTN, HLD, Mild ANGLE (not on CPAP), Morbid Obesity, Fatty liver, depression and anxiety presents to PST preop for total thyroidectomy. pt diagnosed with thyroid nodule 2 years ago. pt had an ultrasound of the neck which showed two right lobe nodules.  s/p biopsy on 21. pt states one nodule showed atypical cells and "90% risk of cancer".       Surgery Information  OR time:      EBL: 100cc          IV Fluids:       Blood Products:   UOP: No luo catheter      PAST MEDICAL & SURGICAL HISTORY:  Morbid obesity    HTN (hypertension)    HLD (hyperlipidemia)    Type 2 diabetes mellitus    Anxiety and depression    Fatty liver    Bilateral carpal tunnel syndrome    OA (osteoarthritis)    Bulging lumbar disc    Right tennis elbow  and left elbow    Left shoulder pain    Thyroid nodule    Malignant neoplasm of thyroid gland    ANGLE (obstructive sleep apnea)  mild ANGLE    H/O  section   and     History of laparoscopic adjustable gastric banding   and removed in       Home Meds: Home Medications:  aspirin 81 mg oral tablet: orally once a day. last dose 21 (2021 06:33)  escitalopram 20 mg oral tablet: 1 tab(s) orally once a day in AM  (2021 06:33)  glipiZIDE 10 mg oral tablet: orally 2 times a day (2021 06:33)  losartan 100 mg oral tablet: 1 tab(s) orally once a day in AM  (2021 06:33)  metFORMIN 1000 mg oral tablet: 1 tab(s) orally 2 times a day (2021 06:33)  rosuvastatin 20 mg oral tablet: 1 tab(s) orally once a day (at bedtime) (:33)  Vitamin D3 125 mcg (5000 intl units) oral tablet: 1 tab(s) orally once a day (2021 06:33)    Allergies: Allergies    No Known Allergies    Intolerances      Soc:   Advanced Directives: Presumed Full Code     ROS:    REVIEW OF SYSTEMS    [ X] A ten-point review of systems was otherwise negative except as noted.  [ ] Due to altered mental status/intubation, subjective information were not able to be obtained from the patient. History was obtained, to the extent possible, from review of the chart and collateral sources of information.      CURRENT MEDICATIONS:   --------------------------------------------------------------------------------------  Neurologic Medications  acetaminophen     Tablet .. 650 milliGRAM(s) Oral every 6 hours PRN Mild Pain (1 - 3)  HYDROmorphone  Injectable 0.5 milliGRAM(s) IV Push every 10 minutes PRN Moderate Pain (4 - 6)  oxyCODONE    IR 5 milliGRAM(s) Oral every 6 hours PRN Moderate Pain (4 - 6)  oxyCODONE    IR 10 milliGRAM(s) Oral every 6 hours PRN Severe Pain (7 - 10)    Respiratory Medications    Cardiovascular Medications    Gastrointestinal Medications  famotidine Injectable 20 milliGRAM(s) IV Push once PRN nausea/vomiting  lactated ringers. 1000 milliLiter(s) IV Continuous <Continuous>  sodium chloride 0.9% lock flush 3 milliLiter(s) IV Push every 8 hours    Genitourinary Medications    Hematologic/Oncologic Medications    Antimicrobial/Immunologic Medications    Endocrine/Metabolic Medications  dextrose 50% Injectable 50 milliLiter(s) IV Push every 15 minutes  dextrose 50% Injectable 25 milliLiter(s) IV Push every 15 minutes  insulin regular Infusion 5 Unit(s)/Hr IV Continuous <Continuous>    Topical/Other Medications    --------------------------------------------------------------------------------------    VITAL SIGNS, INS/OUTS (last 24 hours):  --------------------------------------------------------------------------------------  ICU Vital Signs Last 24 Hrs  T(C): 36.8 (2021 06:17), Max: 36.8 (2021 06:17)  T(F): 98.2 (2021 06:17), Max: 98.2 (2021 06:17)  HR: 81 (2021 06:17) (81 - 81)  BP: 136/65 (2021 06:17) (136/65 - 136/65)  BP(mean): --  ABP: --  ABP(mean): --  RR: 16 (2021 06:17) (16 - 16)  SpO2: 95% (:17) (95% - 95%)    I&O's Summary    --------------------------------------------------------------------------------------    EXAM:  General/Neuro  RASS:   GCS:   Exam: awake and alert, able to answer questions appropriately   - appears diaphoretic  - incision site c/d/i from thyroidectomy    Respiratory  Exam: appears short of breath, breathing with face mask, satting in the mid 90s, but occassionally desatts to the low 90s when asleep  h/o ANGLE  [] Tracheostomy   [] Intubated  Mechanical Ventilation:     Cardiovascular  Exam: RRR  Cardiac Rhythm: Normal Sinus Rhythm  ECHO:     GI  Exam: Abdomen, soft, NT, ND  Current Diet:  Consistent Carb Diet      Tubes/Lines/Drains  ***  [x] Peripheral IV  [] Central Venous Line     	[] R	[] L	[] IJ	[] Fem	[] SC        Type:	    Date Placed:   [X] Arterial Line		[] R	[] L	[] Fem	[] Rad	[] Ax	Date Placed:   [] PICC:         	[] Midline		[] Mediport           [] Urinary Catheter		Date Placed:     Extremities  Exam: Extremities warm, pink, well-perfused.        Derm:  Exam: Good skin turgor, no skin breakdown.      :   Exam: Luo catheter in place.     LABS  --------------------------------------------------------------------------------------  Labs:  CAPILLARY BLOOD GLUCOSE      POCT Blood Glucose.: 241 mg/dL (2021 12:17)  POCT Blood Glucose.: 223 mg/dL (2021 06:28)       LFTs:     Blood Gas Arterial, Lactate: 3.7 mmol/L (21 @ 11:22)  Blood Gas Arterial, Lactate: 3.2 mmol/L (21 @ 10:39)  Blood Gas Arterial, Lactate: 1.9 mmol/L (21 @ 09:18)    ABG - ( 2021 11:22 )  pH: 7.35  /  pCO2: 51    /  pO2: 113   / HCO3: 28    / Base Excess: 1.8   /  SaO2: 98.1            ABG - ( 2021 10:39 )  pH: 7.44  /  pCO2: 43    /  pO2: 86    / HCO3: 29    / Base Excess: 4.5   /  SaO2: 98.3            ABG - ( 2021 09:18 )  pH: 7.41  /  pCO2: 45    /  pO2: 78    / HCO3: 28    / Base Excess: 3.3   /  SaO2: 97.7      Coags:  
HPI:  57 yo F with PMH of type 2 DM, HTN, HLD, ANGLE, obesity, depression, presenting with for total thyroidectomy. Endocrinology was consulted for management of diabetes mellitus.    Pt underwent total thyroidectomy on 21. Per pt was diagnosed with thyroid nodule 2 years ago with 2 nodules, s/p FNA 2021 which was indeterminate but with thyroseq was told she had atypical cells/90% risk of cancer. Denies family history of thyroid cancer. Sister with graves disease. She has never been on LT4 before. Was started on levothyroxine 225 mcg post op. Calcium 8.5, PTH 29. Denies perioral numbness/tingling or paresthesias. Pathology pending.    Patient reports being diagnosed with T2DM 6 years ago and was started on oral meds (metformin 1000 mg BID and glipizide 10 mg daily is her current regimen). She has never been on insulin. Denies complications from DM. No family history of DM. Reports poor diet recently. Does not check her sugars usually but right before surgery started to check at home and sugars ranged 160s-300s. In the OR was hyperglycemic requiring high insulin doses - up to 45 units in last 6 hours on the drip on 21/ Transitioned to basal-bolus regimen with well controlled sugars in ICU.    Endocrinologist:  Dr. Abe Beach ([facility])    Last HbA1c: 8.5 on 10/25/21    Home DM regimen:  metformin 1000 mg bid  glipizide 10 mg daily    Inpatient DM regimen:  lantus 75 units QHS  lispro 20 units with meals  high dose correction scale with meals and before bed    PAST MEDICAL & SURGICAL HISTORY:  Morbid obesity    HTN (hypertension)    HLD (hyperlipidemia)    Type 2 diabetes mellitus    Anxiety and depression    Fatty liver    Bilateral carpal tunnel syndrome    OA (osteoarthritis)    Bulging lumbar disc    Right tennis elbow  and left elbow    Left shoulder pain    Thyroid nodule    Malignant neoplasm of thyroid gland    ANGLE (obstructive sleep apnea)  mild ANGLE    H/O  section   and     History of laparoscopic adjustable gastric banding   and removed in         FAMILY HISTORY:  FH: breast cancer (Grandparent)        Social History:  no smoking, etoh or drugs    Outpatient Medications:    MEDICATIONS  (STANDING):  dextrose 40% Gel 15 Gram(s) Oral once  dextrose 5%. 1000 milliLiter(s) (50 mL/Hr) IV Continuous <Continuous>  dextrose 5%. 1000 milliLiter(s) (100 mL/Hr) IV Continuous <Continuous>  dextrose 50% Injectable 50 milliLiter(s) IV Push every 15 minutes  dextrose 50% Injectable 25 milliLiter(s) IV Push every 15 minutes  escitalopram 20 milliGRAM(s) Oral daily  glucagon  Injectable 1 milliGRAM(s) IntraMuscular once  influenza   Vaccine 0.5 milliLiter(s) IntraMuscular once  insulin glargine Injectable (LANTUS) 60 Unit(s) SubCutaneous at bedtime  insulin lispro (ADMELOG) corrective regimen sliding scale   SubCutaneous three times a day before meals  insulin lispro (ADMELOG) corrective regimen sliding scale   SubCutaneous at bedtime  insulin lispro Injectable (ADMELOG) 15 Unit(s) SubCutaneous three times a day before meals  levothyroxine 200 MICROGram(s) Oral daily  levothyroxine 25 MICROGram(s) Oral daily  losartan 100 milliGRAM(s) Oral daily  polyethylene glycol 3350 17 Gram(s) Oral daily  sodium chloride 0.9% lock flush 3 milliLiter(s) IV Push every 8 hours    MEDICATIONS  (PRN):  acetaminophen     Tablet .. 650 milliGRAM(s) Oral every 6 hours PRN Mild Pain (1 - 3)  famotidine Injectable 20 milliGRAM(s) IV Push once PRN nausea/vomiting  oxyCODONE    IR 5 milliGRAM(s) Oral every 6 hours PRN Moderate Pain (4 - 6)  oxyCODONE    IR 10 milliGRAM(s) Oral every 6 hours PRN Severe Pain (7 - 10)      Allergies    No Known Allergies    Intolerances        Review of Systems:  Constitutional: No fever, good appetite/po intake  Eyes: No blurry vision, diplopia  Neuro: No tremors  HEENT: No pain  Cardiovascular: No chest pain, palpitations  Respiratory: No SOB, no cough  GI: No nausea, vomiting,   : No dysuria, hematuria  Skin: no rash  Psych: no depression  Endocrine: no polyuria, polydipsia  Hem/lymph: no swelling  Osteoporosis: no fractures    ALL OTHER SYSTEMS REVIEWED AND NEGATIVE    PHYSICAL EXAM:  VITALS: T(C): 36.8 (11-10-21 @ 14:11)  T(F): 98.3 (11-10-21 @ 14:11), Max: 99.1 (21 @ 16:00)  HR: 79 (11-10-21 @ 14:11) (66 - 79)  BP: 162/79 (11-10-21 @ 14:11) (125/66 - 162/79)  RR:  (15 - 26)  SpO2:  (95% - 99%)  Wt(kg): --  GENERAL: NAD, well-groomed, well-developed, obese  EYES: No proptosis, extraocular movements intact,  no lid lag, anicteric  HEENT:  Atraumatic, Normocephalic, moist mucous membranes  THYROID: supple, surgical site clean + drain in place with minimal serosanguinous fluid  RESPIRATORY: Clear to auscultation bilaterally; No rales, rhonchi, wheezing, or rubs  CARDIOVASCULAR: Regular rate and rhythm; No murmurs; no peripheral edema  GI: Soft, nontender, non distended, normal bowel sounds  SKIN: Dry, intact, No rashes or lesions  EXTREMITIES: No foot ulcers, distal pedal pulses intact bilaterally  NEURO: sensation intact, no tremors  PSYCH: reactive affect, euthymic mood  CUSHING'S SIGNS: no striae or visible bruising                          11.3   8.25  )-----------( 243      ( 10 Nov 2021 06:32 )             37.3       11-10    143  |  103  |  18  ----------------------------<  134<H>  4.2   |  27  |  0.81    EGFR if : 94  EGFR if non : 81    Ca    8.5      11-10  Mg     1.80     11-10  Phos  4.6     11-10

## 2021-11-11 LAB
GLUCOSE BLDC GLUCOMTR-MCNC: 106 MG/DL — HIGH (ref 70–99)
GLUCOSE BLDC GLUCOMTR-MCNC: 107 MG/DL — HIGH (ref 70–99)
GLUCOSE BLDC GLUCOMTR-MCNC: 140 MG/DL — HIGH (ref 70–99)
GLUCOSE BLDC GLUCOMTR-MCNC: 178 MG/DL — HIGH (ref 70–99)
SURGICAL PATHOLOGY STUDY: SIGNIFICANT CHANGE UP

## 2021-11-11 PROCEDURE — 99232 SBSQ HOSP IP/OBS MODERATE 35: CPT

## 2021-11-11 RX ORDER — ENOXAPARIN SODIUM 100 MG/ML
27 INJECTION SUBCUTANEOUS
Qty: 1000 | Refills: 0
Start: 2021-11-11 | End: 2021-12-10

## 2021-11-11 RX ORDER — LEVOTHYROXINE SODIUM 125 MCG
1 TABLET ORAL
Qty: 30 | Refills: 0
Start: 2021-11-11 | End: 2021-12-10

## 2021-11-11 RX ORDER — ACETAMINOPHEN 500 MG
2 TABLET ORAL
Qty: 40 | Refills: 0
Start: 2021-11-11 | End: 2021-11-15

## 2021-11-11 RX ORDER — INSULIN LISPRO 100/ML
9 VIAL (ML) SUBCUTANEOUS
Qty: 810 | Refills: 0
Start: 2021-11-11 | End: 2021-12-10

## 2021-11-11 RX ORDER — METFORMIN HYDROCHLORIDE 850 MG/1
1 TABLET ORAL
Qty: 0 | Refills: 0 | DISCHARGE

## 2021-11-11 RX ORDER — INSULIN GLARGINE 100 [IU]/ML
27 INJECTION, SOLUTION SUBCUTANEOUS
Qty: 810 | Refills: 0
Start: 2021-11-11 | End: 2021-12-10

## 2021-11-11 RX ADMIN — Medication 200 MICROGRAM(S): at 05:16

## 2021-11-11 RX ADMIN — Medication 9 UNIT(S): at 09:50

## 2021-11-11 RX ADMIN — ESCITALOPRAM OXALATE 20 MILLIGRAM(S): 10 TABLET, FILM COATED ORAL at 13:23

## 2021-11-11 RX ADMIN — Medication 25 MICROGRAM(S): at 05:16

## 2021-11-11 RX ADMIN — SODIUM CHLORIDE 3 MILLILITER(S): 9 INJECTION INTRAMUSCULAR; INTRAVENOUS; SUBCUTANEOUS at 14:00

## 2021-11-11 RX ADMIN — LOSARTAN POTASSIUM 100 MILLIGRAM(S): 100 TABLET, FILM COATED ORAL at 05:16

## 2021-11-11 RX ADMIN — Medication 9 UNIT(S): at 12:11

## 2021-11-11 RX ADMIN — INSULIN GLARGINE 27 UNIT(S): 100 INJECTION, SOLUTION SUBCUTANEOUS at 21:35

## 2021-11-11 RX ADMIN — SODIUM CHLORIDE 3 MILLILITER(S): 9 INJECTION INTRAMUSCULAR; INTRAVENOUS; SUBCUTANEOUS at 22:36

## 2021-11-11 RX ADMIN — Medication 9 UNIT(S): at 17:12

## 2021-11-11 NOTE — DIETITIAN INITIAL EVALUATION ADULT. - PERTINENT LABORATORY DATA
HgA1C 8.5%    POCT Blood Glucose.: 140 mg/dL (11 Nov 2021 08:54)  POCT Blood Glucose.: 78 mg/dL (10 Nov 2021 21:03)  POCT Blood Glucose.: 113 mg/dL (10 Nov 2021 17:03)

## 2021-11-11 NOTE — PROGRESS NOTE ADULT - SUBJECTIVE AND OBJECTIVE BOX
Chief Complaint/Follow-up on: Hyperglycemia in Diabetes management     Subjective:      MEDICATIONS  (STANDING):  dextrose 40% Gel 15 Gram(s) Oral once  dextrose 5%. 1000 milliLiter(s) (50 mL/Hr) IV Continuous <Continuous>  dextrose 5%. 1000 milliLiter(s) (100 mL/Hr) IV Continuous <Continuous>  dextrose 50% Injectable 50 milliLiter(s) IV Push every 15 minutes  dextrose 50% Injectable 25 milliLiter(s) IV Push every 15 minutes  escitalopram 20 milliGRAM(s) Oral daily  glucagon  Injectable 1 milliGRAM(s) IntraMuscular once  influenza   Vaccine 0.5 milliLiter(s) IntraMuscular once  insulin glargine Injectable (LANTUS) 27 Unit(s) SubCutaneous at bedtime  insulin lispro (ADMELOG) corrective regimen sliding scale   SubCutaneous at bedtime  insulin lispro Injectable (ADMELOG) 9 Unit(s) SubCutaneous three times a day with meals  levothyroxine 200 MICROGram(s) Oral daily  levothyroxine 25 MICROGram(s) Oral daily  losartan 100 milliGRAM(s) Oral daily  polyethylene glycol 3350 17 Gram(s) Oral daily  sodium chloride 0.9% lock flush 3 milliLiter(s) IV Push every 8 hours    MEDICATIONS  (PRN):  acetaminophen     Tablet .. 650 milliGRAM(s) Oral every 6 hours PRN Mild Pain (1 - 3)  famotidine Injectable 20 milliGRAM(s) IV Push once PRN nausea/vomiting  oxyCODONE    IR 5 milliGRAM(s) Oral every 6 hours PRN Moderate Pain (4 - 6)  oxyCODONE    IR 10 milliGRAM(s) Oral every 6 hours PRN Severe Pain (7 - 10)    PHYSICAL EXAM:  VITALS: T(C): 36.9 (11-11-21 @ 08:00)  T(F): 98.4 (11-11-21 @ 08:00), Max: 99 (11-10-21 @ 12:00)  HR: 77 (11-11-21 @ 08:00) (67 - 79)  BP: 143/83 (11-11-21 @ 08:00) (120/63 - 163/90)  RR:  (16 - 18)  SpO2:  (94% - 97%)  Wt(kg): --  GENERAL: NAD, well-groomed, well-developed  EYES: No proptosis, no injection  HEENT:  Atraumatic, Normocephalic, moist mucous membranes  THYROID: ***  RESPIRATORY: Clear to auscultation bilaterally; No rales, rhonchi, wheezing, or rubs  CARDIOVASCULAR: Regular rate and rhythm; No murmurs; no peripheral edema  GI: Soft, nontender, non distended, normal bowel sounds  CUSHING'S SIGNS: no striae    POCT Blood Glucose.: 140 mg/dL (11-11-21 @ 08:54)  POCT Blood Glucose.: 78 mg/dL (11-10-21 @ 21:03)  POCT Blood Glucose.: 113 mg/dL (11-10-21 @ 17:03)  POCT Blood Glucose.: 168 mg/dL (11-10-21 @ 10:31)  POCT Blood Glucose.: 130 mg/dL (11-10-21 @ 07:29)  POCT Blood Glucose.: 116 mg/dL (11-09-21 @ 22:39)  POCT Blood Glucose.: 91 mg/dL (11-09-21 @ 17:12)  POCT Blood Glucose.: 88 mg/dL (11-09-21 @ 17:01)  POCT Blood Glucose.: 182 mg/dL (11-09-21 @ 11:55)  POCT Blood Glucose.: 161 mg/dL (11-09-21 @ 08:08)  POCT Blood Glucose.: 184 mg/dL (11-09-21 @ 05:55)  POCT Blood Glucose.: 177 mg/dL (11-09-21 @ 03:09)  POCT Blood Glucose.: 150 mg/dL (11-09-21 @ 01:06)  POCT Blood Glucose.: 93 mg/dL (11-08-21 @ 23:52)  POCT Blood Glucose.: 108 mg/dL (11-08-21 @ 22:59)  POCT Blood Glucose.: 116 mg/dL (11-08-21 @ 22:03)  POCT Blood Glucose.: 155 mg/dL (11-08-21 @ 21:00)  POCT Blood Glucose.: 183 mg/dL (11-08-21 @ 20:02)  POCT Blood Glucose.: 178 mg/dL (11-08-21 @ 19:00)  POCT Blood Glucose.: 227 mg/dL (11-08-21 @ 18:13)  POCT Blood Glucose.: 257 mg/dL (11-08-21 @ 17:15)  POCT Blood Glucose.: 331 mg/dL (11-08-21 @ 15:32)  POCT Blood Glucose.: 248 mg/dL (11-08-21 @ 13:03)  POCT Blood Glucose.: 241 mg/dL (11-08-21 @ 12:17)    11-10    143  |  103  |  18  ----------------------------<  134<H>  4.2   |  27  |  0.81    EGFR if : 94  EGFR if non : 81    Ca    8.5      11-10  Mg     1.80     11-10  Phos  4.6     11-10    Thyroid Function Tests:                       Chief Complaint/Follow-up on: Hyperglycemia in Diabetes management     Subjective:  Feeling well.  Denies any pain, nausea, vomiting, diarrhea, AP.    No pain at the surgical site   Reports mild numbness at the tip of the tongue since surgery which is improving.    Denies tingling/numbness or muscle spams     MEDICATIONS  (STANDING):  dextrose 40% Gel 15 Gram(s) Oral once  dextrose 5%. 1000 milliLiter(s) (50 mL/Hr) IV Continuous <Continuous>  dextrose 5%. 1000 milliLiter(s) (100 mL/Hr) IV Continuous <Continuous>  dextrose 50% Injectable 50 milliLiter(s) IV Push every 15 minutes  dextrose 50% Injectable 25 milliLiter(s) IV Push every 15 minutes  escitalopram 20 milliGRAM(s) Oral daily  glucagon  Injectable 1 milliGRAM(s) IntraMuscular once  influenza   Vaccine 0.5 milliLiter(s) IntraMuscular once  insulin glargine Injectable (LANTUS) 27 Unit(s) SubCutaneous at bedtime  insulin lispro (ADMELOG) corrective regimen sliding scale   SubCutaneous at bedtime  insulin lispro Injectable (ADMELOG) 9 Unit(s) SubCutaneous three times a day with meals  levothyroxine 200 MICROGram(s) Oral daily  levothyroxine 25 MICROGram(s) Oral daily  losartan 100 milliGRAM(s) Oral daily  polyethylene glycol 3350 17 Gram(s) Oral daily  sodium chloride 0.9% lock flush 3 milliLiter(s) IV Push every 8 hours    MEDICATIONS  (PRN):  acetaminophen     Tablet .. 650 milliGRAM(s) Oral every 6 hours PRN Mild Pain (1 - 3)  famotidine Injectable 20 milliGRAM(s) IV Push once PRN nausea/vomiting  oxyCODONE    IR 5 milliGRAM(s) Oral every 6 hours PRN Moderate Pain (4 - 6)  oxyCODONE    IR 10 milliGRAM(s) Oral every 6 hours PRN Severe Pain (7 - 10)    PHYSICAL EXAM:  VITALS: T(C): 36.9 (11-11-21 @ 08:00)  T(F): 98.4 (11-11-21 @ 08:00), Max: 99 (11-10-21 @ 12:00)  HR: 77 (11-11-21 @ 08:00) (67 - 79)  BP: 143/83 (11-11-21 @ 08:00) (120/63 - 163/90)  RR:  (16 - 18)  SpO2:  (94% - 97%)  Wt(kg): --  GENERAL: NAD, well-groomed, well-developed  EYES: No proptosis, no injection  HEENT:  Atraumatic, Normocephalic, moist mucous membranes  THYROID: s/p total thyroidectomy - fresh surgical scar - no surrounding erythema or oozing noted   RESPIRATORY: Clear to auscultation bilaterally; No rales, rhonchi, wheezing, or rubs  CARDIOVASCULAR: Regular rate and rhythm; No murmurs; no peripheral edema  GI: Soft, nontender, non distended, normal bowel sounds  CUSHING'S SIGNS: no striae    POCT Blood Glucose.: 140 mg/dL (11-11-21 @ 08:54)  POCT Blood Glucose.: 78 mg/dL (11-10-21 @ 21:03)  POCT Blood Glucose.: 113 mg/dL (11-10-21 @ 17:03)  POCT Blood Glucose.: 168 mg/dL (11-10-21 @ 10:31)  POCT Blood Glucose.: 130 mg/dL (11-10-21 @ 07:29)  POCT Blood Glucose.: 116 mg/dL (11-09-21 @ 22:39)  POCT Blood Glucose.: 91 mg/dL (11-09-21 @ 17:12)  POCT Blood Glucose.: 88 mg/dL (11-09-21 @ 17:01)  POCT Blood Glucose.: 182 mg/dL (11-09-21 @ 11:55)  POCT Blood Glucose.: 161 mg/dL (11-09-21 @ 08:08)  POCT Blood Glucose.: 184 mg/dL (11-09-21 @ 05:55)  POCT Blood Glucose.: 177 mg/dL (11-09-21 @ 03:09)  POCT Blood Glucose.: 150 mg/dL (11-09-21 @ 01:06)  POCT Blood Glucose.: 93 mg/dL (11-08-21 @ 23:52)  POCT Blood Glucose.: 108 mg/dL (11-08-21 @ 22:59)  POCT Blood Glucose.: 116 mg/dL (11-08-21 @ 22:03)  POCT Blood Glucose.: 155 mg/dL (11-08-21 @ 21:00)  POCT Blood Glucose.: 183 mg/dL (11-08-21 @ 20:02)  POCT Blood Glucose.: 178 mg/dL (11-08-21 @ 19:00)  POCT Blood Glucose.: 227 mg/dL (11-08-21 @ 18:13)  POCT Blood Glucose.: 257 mg/dL (11-08-21 @ 17:15)  POCT Blood Glucose.: 331 mg/dL (11-08-21 @ 15:32)  POCT Blood Glucose.: 248 mg/dL (11-08-21 @ 13:03)  POCT Blood Glucose.: 241 mg/dL (11-08-21 @ 12:17)    11-10    143  |  103  |  18  ----------------------------<  134<H>  4.2   |  27  |  0.81    EGFR if : 94  EGFR if non : 81    Ca    8.5      11-10  Mg     1.80     11-10  Phos  4.6     11-10    Thyroid Function Tests:

## 2021-11-11 NOTE — DIETITIAN INITIAL EVALUATION ADULT. - OTHER INFO
Per chart----55 yo F with PMH of type 2 DM, HTN, HLD, ANGLE, obesity, depression, presenting with for total thyroidectomy.    Pt underwent total thyroidectomy on 11/8/21. Per pt was diagnosed with thyroid nodule 2 years ago with 2 nodules, s/p FNA 6/2021 which was indeterminate but with thyroseq was told she had atypical cells/90% risk of cancer. Denies family history of thyroid cancer. Sister with graves disease. She has never been on LT4 before. Was started on levothyroxine 225 mcg post op. Calcium 8.5, PTH 29. Denies perioral numbness/tingling or paresthesias. Pathology pending.    Reports good appetite and po intake and denies nausea/vomiting/diarrhea/constipation or any issues with chewing/swallowing. No change in weight prior to admission. Pt very interested in receiving diabetes related nutrition education. Appears to have some previous knowledge as she used to follow [employer]. Reports gradual increase in HgA1C---> (5/21) 7.1%  (8/21) 8.2%. Informed of current level (8.5%) and goal to work towards. Pt also educated on micro/macrovascular complications of uncontrolled glucose levels.Pt with hx of HTN/Hyperlipidemia. Nutrition education provided on low na, heart healthy and consistent CHO diet and related principles. Pt was very engaging during session, asked multiple, appropriate questions and returned excellent feedback.   Encouraged to follow with outpatient Diabetes Wellness team for ongoing education, contact information provided. Pt may also benefit from a continuous glucose monitor. Pt states she will follow up with Provider on this.

## 2021-11-11 NOTE — PROGRESS NOTE ADULT - ASSESSMENT
A/P: 55 yo F with PMH of type 2 DM, HTN, HLD, ANGLE, obesity, depression, presenting with for total thyroidectomy. Endocrinology was consulted for management of diabetes mellitus.    #Uncontrolled Type 2 Diabetes Mellitus  - A1c 8.5; on metformin 1000 mg bid + glipizide 10 mg daily at home. Requiring high insulin doses in OR/post op possibly due to surgical stress/hospitalization, will likely see downtrend in insulin requirements  - due to very high insulin needs and need for good wound healing will discharge patient on insulin, but will plan for weight based dosing for discharge  - Recommend lantus 60 units QHS  - Recommend admelog 15 units with meals  - Recommend moderate lispro correction scale TIDQAC and QHS  - Please check FSG before meals and QHS, or q6h while NPO  - Please obtain nutrition consult  - Patient will need insulin teaching prior to discharge    DISCHARGE PLANNING:  - Make sure patient knows how to inject insulin and check fingersticks with glucometer (ask bedside RN for teaching; provider to RN order placed)  - Discharge on basal insulin (lantus/glargine) and pre-meal insulin (admelog/lispro). Do not discharge on lispro correction scale or bedtime scale.  - At home, Patient should check FSBG premeals and bedtime. Pt should call their doctor when FSBG <70 or above >400 and or consistently above 200s as changes in the regimen will have to be made.  -Advised that pt should call PMD for DM related questions or concerns until pt is seen by CDE or Endocrine team.  - Will likely discharge on weight-based insulin + metformin (STOP glipizide). Please confirm with endo prior to discharge.    DISCHARGE RX:  - Glucometer (ACCU_CHECK jose Connect, Ascensia Contour Next EZ or One, Freestyle [place] LITE or OneTouch Verio IQ)  - Glucometer test strips and lancets  (make sure compatible with glucometer): Dispense #100 (or #200), use as directed (3 refills)  - Lantus Solostar Pen (or Basaglar Kwikpen): Inject 35 units sc before bedtime, dispense 5 pens (3 refills)   - Novolog Flexpen (or Humalog Kwikpen): Inject 12 units sc before meals, dispense 5 pens (3 refills)  - Metformin 1000 mg BID; prescribe 60 tabs, 3 refills  - BD kristy 4mm pen needles: dispense #100, use as directed (3 refills)  - Alcohol pads: dispense #100, use as directed (3 refills)    Please provide down-titration instructions if patient experiences glucoses <80 upon discharge:  reduce lantus to 27 units and admelog to 9 units    Will arrange appt. Please provide contact info as pt wants to transfer care to [facility]:  [facility]:  [address] [place] [address]. [address]. [place] [address]. Tel: [phone]    #Hypertension  - on ARB  - BP goal <130/80  - Management as per primary team    #Hyperlipidemia  - check fasting lipid panel as outpatient  - rosuvastatin 20 mg (home med)    #Thyroid nodules s/p total thyroidectomy  - continue levothyroxine 225 mcg (wt based dose)  - follow up path as outpatient  - please check baseline TSH, FT4  - PTH, calcium normal    Discussed with primary team and placed insulin orders as above    Tabitha Rodas MD  Attending Physician   Department of Endocrinology, Diabetes and Metabolism     If before 9AM or after 5PM, or on weekends/holidays, please call the Endocrine answering service for assistance ([phone]).  For nonurgent matters, please email [email or web address] for assistance.          A/P: 55 yo F with PMH of type 2 DM, HTN, HLD, ANGLE, obesity, depression, presenting with for total thyroidectomy. Endocrinology was consulted for management of diabetes mellitus.    #Uncontrolled Type 2 Diabetes Mellitus  - A1c 8.5; on metformin 1000 mg bid + glipizide 10 mg daily at home.   - Required high insulin doses in OR/post op possibly due to surgical stress/hospitalization, but dose requirements has decreased.    - BG better controlled   - , C/W lantus 27 units QHS  - C/W admelog 9 units with meals  - Recommend moderate lispro correction scale TIDQAC and QHS  - Please check FSG before meals and QHS, or q6h while NPO  - Please obtain nutrition consult  - Patient received insulin teaching, feels comfortable giving insulin at home.      DISCHARGE PLANNING:  - patient can be discharged home on the current regimen   - Discharge on basal insulin (lantus/glargine) and pre-meal insulin (admelog/lispro). Do not discharge on lispro correction scale or bedtime scale.  - At home, Patient should check FSBG premeals and bedtime. Pt should call their doctor when FSBG <70 or above >400 and or consistently above 200s as changes in the regimen will have to be made.  -Advised that pt should call PMD for DM related questions or concerns until pt is seen by CDE or Endocrine team.    DISCHARGE RX:  - Glucometer (ACCU_CHECK jose Connect, Ascensia Contour Next EZ or One, Freestyle [place] LITE or OneTouch Verio IQ)  - Glucometer test strips and lancets  (make sure compatible with glucometer): Dispense #100 (or #200), use as directed (3 refills)  - Lantus Solostar Pen (or Basaglar Kwikpen): Inject 27 units sc before bedtime, dispense 5 pens (3 refills)   - Novolog Flexpen (or Humalog Kwikpen): Inject 9 units sc before meals, dispense 5 pens (3 refills)  - Metformin 1000 mg BID; prescribe 60 tabs, 3 refills  - BD kristy 4mm pen needles: dispense #100, use as directed (3 refills)  - Alcohol pads: dispense #100, use as directed (3 refills)    Please provide down-titration instructions if patient experiences glucoses <80 upon discharge:  reduce lantus to 27 units and admelog to 9 units    Will arrange appt. Please provide contact info as pt wants to transfer care to [facility]:  [facility]:  [address] [place] [address]. [address]. [place] [address]. Tel: [phone]    #Hypertension  - on ARB  - BP goal <130/80  - Management as per primary team    #Hyperlipidemia  - check fasting lipid panel as outpatient  - rosuvastatin 20 mg (home med)    #Thyroid nodules s/p total thyroidectomy  - continue levothyroxine 225 mcg (wt based dose)  - follow up path as outpatient  - please check baseline TSH, FT4  - PTH, calcium normal    Discussed with primary team and placed insulin orders as above    Tabitha Rodas MD  Attending Physician   Department of Endocrinology, Diabetes and Metabolism     If before 9AM or after 5PM, or on weekends/holidays, please call the Endocrine answering service for assistance ([phone]).  For nonurgent matters, please email [email or web address] for assistance.

## 2021-11-11 NOTE — DIETITIAN INITIAL EVALUATION ADULT. - PERTINENT MEDS FT
MEDICATIONS  (STANDING):  dextrose 40% Gel 15 Gram(s) Oral once  dextrose 5%. 1000 milliLiter(s) (50 mL/Hr) IV Continuous <Continuous>  dextrose 5%. 1000 milliLiter(s) (100 mL/Hr) IV Continuous <Continuous>  dextrose 50% Injectable 50 milliLiter(s) IV Push every 15 minutes  dextrose 50% Injectable 25 milliLiter(s) IV Push every 15 minutes  escitalopram 20 milliGRAM(s) Oral daily  glucagon  Injectable 1 milliGRAM(s) IntraMuscular once  influenza   Vaccine 0.5 milliLiter(s) IntraMuscular once  insulin glargine Injectable (LANTUS) 27 Unit(s) SubCutaneous at bedtime  insulin lispro (ADMELOG) corrective regimen sliding scale   SubCutaneous at bedtime  insulin lispro Injectable (ADMELOG) 9 Unit(s) SubCutaneous three times a day with meals  levothyroxine 200 MICROGram(s) Oral daily  levothyroxine 25 MICROGram(s) Oral daily  losartan 100 milliGRAM(s) Oral daily  polyethylene glycol 3350 17 Gram(s) Oral daily  sodium chloride 0.9% lock flush 3 milliLiter(s) IV Push every 8 hours

## 2021-11-11 NOTE — PROGRESS NOTE ADULT - SUBJECTIVE AND OBJECTIVE BOX
s/p TT, parathyroid reimplantation on 11/8, s/p SICU for hyperglycemia.   Seen by endocrine yesterday, hypoglycemic yesterday requiring treatment.   Incision well healing    Vital Signs Last 24 Hrs  T(C): 36.8 (11 Nov 2021 01:54), Max: 37.2 (10 Nov 2021 12:00)  T(F): 98.2 (11 Nov 2021 01:54), Max: 99 (10 Nov 2021 12:00)  HR: 67 (11 Nov 2021 01:54) (67 - 79)  BP: 121/70 (11 Nov 2021 01:54) (121/70 - 163/90)  BP(mean): 80 (10 Nov 2021 12:00) (79 - 80)  RR: 16 (11 Nov 2021 01:54) (16 - 21)  SpO2: 96% (11 Nov 2021 01:54) (94% - 97%)    NAD  neck soft and flat  CHERYLE with ss output    A/P: 56y F s/p TT, parathyroid reimplantation on 11/8. doing well.    - f/u endo recs  - will stay admitted for high drain output, patient not comfortable going home with drain  - call or page with questions or concerns

## 2021-11-11 NOTE — DIETITIAN INITIAL EVALUATION ADULT. - PERSON TAUGHT/METHOD
verbal instruction/written material/skill demonstration/individual instruction/pictorial/patient instructed/teach back - (Patient repeats in own words)/ask me 3

## 2021-11-12 ENCOUNTER — TRANSCRIPTION ENCOUNTER (OUTPATIENT)
Age: 56
End: 2021-11-12

## 2021-11-12 VITALS
OXYGEN SATURATION: 98 % | SYSTOLIC BLOOD PRESSURE: 140 MMHG | DIASTOLIC BLOOD PRESSURE: 80 MMHG | HEART RATE: 81 BPM | TEMPERATURE: 98 F | RESPIRATION RATE: 18 BRPM

## 2021-11-12 LAB
GLUCOSE BLDC GLUCOMTR-MCNC: 146 MG/DL — HIGH (ref 70–99)
GLUCOSE BLDC GLUCOMTR-MCNC: 169 MG/DL — HIGH (ref 70–99)

## 2021-11-12 PROCEDURE — 99233 SBSQ HOSP IP/OBS HIGH 50: CPT

## 2021-11-12 RX ORDER — INSULIN ASPART 100 [IU]/ML
9 INJECTION, SOLUTION SUBCUTANEOUS
Qty: 5 | Refills: 3
Start: 2021-11-12 | End: 2022-03-11

## 2021-11-12 RX ADMIN — Medication 9 UNIT(S): at 12:09

## 2021-11-12 RX ADMIN — Medication 9 UNIT(S): at 09:06

## 2021-11-12 RX ADMIN — SODIUM CHLORIDE 3 MILLILITER(S): 9 INJECTION INTRAMUSCULAR; INTRAVENOUS; SUBCUTANEOUS at 07:17

## 2021-11-12 RX ADMIN — Medication 200 MICROGRAM(S): at 05:04

## 2021-11-12 RX ADMIN — LOSARTAN POTASSIUM 100 MILLIGRAM(S): 100 TABLET, FILM COATED ORAL at 05:05

## 2021-11-12 RX ADMIN — ESCITALOPRAM OXALATE 20 MILLIGRAM(S): 10 TABLET, FILM COATED ORAL at 12:09

## 2021-11-12 RX ADMIN — Medication 25 MICROGRAM(S): at 05:05

## 2021-11-12 NOTE — DISCHARGE NOTE NURSING/CASE MANAGEMENT/SOCIAL WORK - PATIENT PORTAL LINK FT
You can access the FollowMyHealth Patient Portal offered by [facility] by registering at the following website: [email or web address]. By joining [facility]’s FollowMyHealth portal, you will also be able to view your health information using other applications (apps) compatible with our system.

## 2021-11-12 NOTE — PROGRESS NOTE ADULT - ASSESSMENT
A/P: 57 yo F with PMH of type 2 DM, HTN, HLD, ANGLE, obesity, depression, presenting with for total thyroidectomy. Endocrinology was consulted for management of diabetes mellitus.    Uncontrolled Type 2 Diabetes Mellitus  Impression:  - A1c 8.5; on metformin 1000 mg bid + glipizide 10 mg daily at home.   - Required high insulin doses in OR/post op possibly due to surgical stress/hospitalization, but dose requirements has decreased since   - BG 78 after breakfast yesterday.  Reports breakfast is the smallest meal.    - When BG was 78, pt had hypoglycemic symptoms.      DISCHARGE RECS:   - c/w home dose metformin   - c/w C/W lantus 27 units QHS  - C/W admelog 9 units with meals  - counseled extensively on diet   - discussed at great length on how to down-titrate insulin.  For now, home BG goal 100-120.  If FBG less than 100, pt advised to decrease Lantus by 2 units.    - If pt has hypoglycemic symptoms post meal, educated on how to down-titrate Admelog.    - pt encourage to reach out to her private endocrinologist or our clinic for any questions/concern (clinic number given).     - discussed how to manage hypoglycemia.   - pt was able to verbalize all the instructions given.    - answered all her questions.    - > 40 minutes spent counseling the pt and answering her questions     DISCHARGE RX:  - Glucometer (ACCU_CHECK jose Connect, Ascensia Contour Next EZ or One, Freestyle [place] LITE or OneTouch Verio IQ)  - Glucometer test strips and lancets  (make sure compatible with glucometer): Dispense #100 (or #200), use as directed (3 refills)  - Lantus Solostar Pen (or Basaglar Kwikpen): Inject 27 units sc before bedtime, dispense 5 pens (3 refills)   - Novolog Flexpen (or Humalog Kwikpen): Inject 9 units sc before meals, dispense 5 pens (3 refills)  - Metformin 1000 mg BID; prescribe 60 tabs, 3 refills  - BD kristy 4mm pen needles: dispense #100, use as directed (3 refills)  - Alcohol pads: dispense #100, use as directed (3 refills)    #Thyroid nodules s/p total thyroidectomy  - continue levothyroxine 225 mcg (wt based dose)  - follow up path as outpatient  - PTH, calcium normal  - no baseline TSH checked; can be done as OP      Tabitha Rodas MD  Attending Physician   Department of Endocrinology, Diabetes and Metabolism

## 2021-11-12 NOTE — DISCHARGE NOTE NURSING/CASE MANAGEMENT/SOCIAL WORK - NSDCPNINST_GEN_ALL_CORE
Report any swelling, redness or tenderness at incisions as well as fever above 100.4 to provider. Continue with glucose checks as instructed and administer insulin instructed.

## 2021-11-12 NOTE — PROGRESS NOTE ADULT - SUBJECTIVE AND OBJECTIVE BOX
Chief Complaint/Follow-up on: Hyperglycemia in Diabetes management     Subjective:  Feeling well.  Denies any pain, nausea, vomiting, diarrhea, AP.    No pain at the surgical site   Denies tingling/numbness or muscle spams   Is being discharged today     MEDICATIONS  (STANDING):  dextrose 40% Gel 15 Gram(s) Oral once  dextrose 5%. 1000 milliLiter(s) (50 mL/Hr) IV Continuous <Continuous>  dextrose 5%. 1000 milliLiter(s) (100 mL/Hr) IV Continuous <Continuous>  dextrose 50% Injectable 50 milliLiter(s) IV Push every 15 minutes  dextrose 50% Injectable 25 milliLiter(s) IV Push every 15 minutes  escitalopram 20 milliGRAM(s) Oral daily  glucagon  Injectable 1 milliGRAM(s) IntraMuscular once  influenza   Vaccine 0.5 milliLiter(s) IntraMuscular once  insulin glargine Injectable (LANTUS) 27 Unit(s) SubCutaneous at bedtime  insulin lispro (ADMELOG) corrective regimen sliding scale   SubCutaneous at bedtime  insulin lispro Injectable (ADMELOG) 9 Unit(s) SubCutaneous three times a day with meals  levothyroxine 200 MICROGram(s) Oral daily  levothyroxine 25 MICROGram(s) Oral daily  losartan 100 milliGRAM(s) Oral daily  polyethylene glycol 3350 17 Gram(s) Oral daily  sodium chloride 0.9% lock flush 3 milliLiter(s) IV Push every 8 hours    MEDICATIONS  (PRN):  acetaminophen     Tablet .. 650 milliGRAM(s) Oral every 6 hours PRN Mild Pain (1 - 3)  famotidine Injectable 20 milliGRAM(s) IV Push once PRN nausea/vomiting  oxyCODONE    IR 5 milliGRAM(s) Oral every 6 hours PRN Moderate Pain (4 - 6)  oxyCODONE    IR 10 milliGRAM(s) Oral every 6 hours PRN Severe Pain (7 - 10)    PHYSICAL EXAM:  VITALS: T(C): 36.9 (11-11-21 @ 08:00)  T(F): 98.4 (11-11-21 @ 08:00), Max: 99 (11-10-21 @ 12:00)  HR: 77 (11-11-21 @ 08:00) (67 - 79)  BP: 143/83 (11-11-21 @ 08:00) (120/63 - 163/90)  RR:  (16 - 18)  SpO2:  (94% - 97%)  Wt(kg): --  GENERAL: NAD, well-groomed, well-developed  EYES: No proptosis, no injection  HEENT:  Atraumatic, Normocephalic, moist mucous membranes  THYROID: s/p total thyroidectomy - fresh surgical scar - no surrounding erythema or oozing noted   RESPIRATORY: Clear to auscultation bilaterally; No rales, rhonchi, wheezing, or rubs  CARDIOVASCULAR: Regular rate and rhythm; No murmurs; no peripheral edema  GI: Soft, nontender, non distended, normal bowel sounds  CUSHING'S SIGNS: no striae    POCT Blood Glucose.: 140 mg/dL (11-11-21 @ 08:54)  POCT Blood Glucose.: 78 mg/dL (11-10-21 @ 21:03)  POCT Blood Glucose.: 113 mg/dL (11-10-21 @ 17:03)  POCT Blood Glucose.: 168 mg/dL (11-10-21 @ 10:31)  POCT Blood Glucose.: 130 mg/dL (11-10-21 @ 07:29)  POCT Blood Glucose.: 116 mg/dL (11-09-21 @ 22:39)  POCT Blood Glucose.: 91 mg/dL (11-09-21 @ 17:12)  POCT Blood Glucose.: 88 mg/dL (11-09-21 @ 17:01)  POCT Blood Glucose.: 182 mg/dL (11-09-21 @ 11:55)  POCT Blood Glucose.: 161 mg/dL (11-09-21 @ 08:08)  POCT Blood Glucose.: 184 mg/dL (11-09-21 @ 05:55)  POCT Blood Glucose.: 177 mg/dL (11-09-21 @ 03:09)  POCT Blood Glucose.: 150 mg/dL (11-09-21 @ 01:06)  POCT Blood Glucose.: 93 mg/dL (11-08-21 @ 23:52)  POCT Blood Glucose.: 108 mg/dL (11-08-21 @ 22:59)  POCT Blood Glucose.: 116 mg/dL (11-08-21 @ 22:03)  POCT Blood Glucose.: 155 mg/dL (11-08-21 @ 21:00)  POCT Blood Glucose.: 183 mg/dL (11-08-21 @ 20:02)  POCT Blood Glucose.: 178 mg/dL (11-08-21 @ 19:00)  POCT Blood Glucose.: 227 mg/dL (11-08-21 @ 18:13)  POCT Blood Glucose.: 257 mg/dL (11-08-21 @ 17:15)  POCT Blood Glucose.: 331 mg/dL (11-08-21 @ 15:32)  POCT Blood Glucose.: 248 mg/dL (11-08-21 @ 13:03)  POCT Blood Glucose.: 241 mg/dL (11-08-21 @ 12:17)    11-10    143  |  103  |  18  ----------------------------<  134<H>  4.2   |  27  |  0.81    EGFR if : 94  EGFR if non : 81    Ca    8.5      11-10  Mg     1.80     11-10  Phos  4.6     11-10    Thyroid Function Tests:

## 2021-11-12 NOTE — PROGRESS NOTE ADULT - REASON FOR ADMISSION
S/P total thyroidectomy, consulted for hyperglycemia in T2D
S/P total thyroidectomy, consulted for hyperglycemia in T2D

## 2021-11-22 ENCOUNTER — APPOINTMENT (OUTPATIENT)
Dept: ENDOCRINOLOGY | Facility: CLINIC | Age: 56
End: 2021-11-22

## 2021-11-30 ENCOUNTER — APPOINTMENT (OUTPATIENT)
Dept: OTOLARYNGOLOGY | Facility: CLINIC | Age: 56
End: 2021-11-30
Payer: MEDICARE

## 2021-11-30 VITALS
SYSTOLIC BLOOD PRESSURE: 132 MMHG | HEART RATE: 87 BPM | HEIGHT: 62 IN | BODY MASS INDEX: 53.92 KG/M2 | WEIGHT: 293 LBS | DIASTOLIC BLOOD PRESSURE: 80 MMHG

## 2021-11-30 VITALS — TEMPERATURE: 98 F

## 2021-11-30 DIAGNOSIS — C73 MALIGNANT NEOPLASM OF THYROID GLAND: ICD-10-CM

## 2021-11-30 PROCEDURE — 99024 POSTOP FOLLOW-UP VISIT: CPT

## 2021-11-30 RX ORDER — INSULIN GLARGINE 100 [IU]/ML
100 INJECTION, SOLUTION SUBCUTANEOUS
Refills: 0 | Status: ACTIVE | COMMUNITY

## 2021-11-30 RX ORDER — LEVOTHYROXINE SODIUM 0.17 MG/1
TABLET ORAL
Refills: 0 | Status: ACTIVE | COMMUNITY

## 2021-11-30 NOTE — CONSULT LETTER
[Dear  ___] : Dear  [unfilled], [Courtesy Letter:] : I had the pleasure of seeing your patient, [unfilled], in my office today. [Please see my note below.] : Please see my note below. [Consult Closing:] : Thank you very much for allowing me to participate in the care of this patient.  If you have any questions, please do not hesitate to contact me. [Sincerely,] : Sincerely, [FreeTextEntry2] : Dr Abe Guzman  [FreeTextEntry3] : \par Daniel Juarez MD, FACS\par \par Otolaryngology-Head and Neck Surgery\par [employer] [facility]\par

## 2021-11-30 NOTE — REASON FOR VISIT
[Post-Operative Visit] : a post-operative visit [Other: _____] : [unfilled] [FreeTextEntry2] : s/p total thyroidectomy on 11.8.21

## 2021-11-30 NOTE — HISTORY OF PRESENT ILLNESS
[de-identified] : This is a 56 yro female patient w/o sleep apnea, referred by Dr. Guzman  for thyroid nodule. This was found 2 years ago during physical exam/US. Pt here for follow-up s/p s/p total thyroidectomy on 11/8/2021. Taking 200mcg Levothyroxine.  \par Pt c/o some dysphagia over the last 2 days, having to take smaller bites, still with forceful swallowing and speaking.  \par Some swelling in anterior neck, getting better but still present. One day of weeping from the incision site last week, clear with minimal drop of blood. \par Denies neck pain, dyspnea, hemoptysis, fever, chills. \par \par surgical path:\par Final Diagnosis\par \par 1. Parathyroid, "rule out   right superior parathyroid", biopsy\par - Parathyroid tissue\par \par \par 2. Thyroid, total thyroidectomy\par - Papillary thyroid carcinoma, see synoptic summary\par - Adenomatoid nodules\par - 1 lymph node negative for metastatic carcinoma\par

## 2021-11-30 NOTE — PHYSICAL EXAM
[de-identified] : Incision C/D/I. [Midline] : trachea located in midline position [Normal] : no rashes

## 2021-12-28 LAB
CALCIUM SERPL-MCNC: 9.8 MG/DL
CALCIUM SERPL-MCNC: 9.8 MG/DL
PARATHYROID HORMONE INTACT: 35 PG/ML
T3FREE SERPL-MCNC: 2.01 PG/ML
T4 FREE SERPL-MCNC: 1.2 NG/DL
TSH SERPL-ACNC: 10.8 UIU/ML

## 2021-12-29 ENCOUNTER — APPOINTMENT (OUTPATIENT)
Dept: PULMONOLOGY | Facility: CLINIC | Age: 56
End: 2021-12-29

## 2022-01-11 ENCOUNTER — APPOINTMENT (OUTPATIENT)
Dept: ENDOCRINOLOGY | Facility: CLINIC | Age: 57
End: 2022-01-11

## 2022-01-29 ENCOUNTER — APPOINTMENT (OUTPATIENT)
Dept: NUCLEAR MEDICINE | Facility: IMAGING CENTER | Age: 57
End: 2022-01-29

## 2022-02-03 ENCOUNTER — APPOINTMENT (OUTPATIENT)
Dept: NUCLEAR MEDICINE | Facility: IMAGING CENTER | Age: 57
End: 2022-02-03

## 2022-02-07 ENCOUNTER — RESULT REVIEW (OUTPATIENT)
Age: 57
End: 2022-02-07

## 2022-04-05 ENCOUNTER — APPOINTMENT (OUTPATIENT)
Dept: NUCLEAR MEDICINE | Facility: CLINIC | Age: 57
End: 2022-04-05
Payer: MEDICARE

## 2022-04-05 ENCOUNTER — OUTPATIENT (OUTPATIENT)
Dept: OUTPATIENT SERVICES | Facility: HOSPITAL | Age: 57
LOS: 1 days | End: 2022-04-05

## 2022-04-05 DIAGNOSIS — C73 MALIGNANT NEOPLASM OF THYROID GLAND: ICD-10-CM

## 2022-04-05 DIAGNOSIS — Z98.891 HISTORY OF UTERINE SCAR FROM PREVIOUS SURGERY: Chronic | ICD-10-CM

## 2022-04-05 DIAGNOSIS — Z98.84 BARIATRIC SURGERY STATUS: Chronic | ICD-10-CM

## 2022-04-06 ENCOUNTER — APPOINTMENT (OUTPATIENT)
Dept: NUCLEAR MEDICINE | Facility: CLINIC | Age: 57
End: 2022-04-06

## 2022-04-07 ENCOUNTER — APPOINTMENT (OUTPATIENT)
Dept: NUCLEAR MEDICINE | Facility: CLINIC | Age: 57
End: 2022-04-07

## 2022-04-07 PROCEDURE — 79005 NUCLEAR RX ORAL ADMIN: CPT | Mod: 26

## 2022-04-14 ENCOUNTER — OUTPATIENT (OUTPATIENT)
Dept: OUTPATIENT SERVICES | Facility: HOSPITAL | Age: 57
LOS: 1 days | End: 2022-04-14

## 2022-04-14 ENCOUNTER — APPOINTMENT (OUTPATIENT)
Dept: NUCLEAR MEDICINE | Facility: CLINIC | Age: 57
End: 2022-04-14
Payer: MEDICARE

## 2022-04-14 DIAGNOSIS — C73 MALIGNANT NEOPLASM OF THYROID GLAND: ICD-10-CM

## 2022-04-14 DIAGNOSIS — Z98.891 HISTORY OF UTERINE SCAR FROM PREVIOUS SURGERY: Chronic | ICD-10-CM

## 2022-04-14 DIAGNOSIS — Z98.84 BARIATRIC SURGERY STATUS: Chronic | ICD-10-CM

## 2022-04-14 PROCEDURE — 78018 THYROID MET IMAGING BODY: CPT | Mod: 26

## 2022-05-27 ENCOUNTER — APPOINTMENT (OUTPATIENT)
Dept: OTOLARYNGOLOGY | Facility: CLINIC | Age: 57
End: 2022-05-27

## 2022-08-08 ENCOUNTER — APPOINTMENT (OUTPATIENT)
Dept: PULMONOLOGY | Facility: CLINIC | Age: 57
End: 2022-08-08

## 2022-08-08 VITALS
HEIGHT: 62 IN | SYSTOLIC BLOOD PRESSURE: 138 MMHG | DIASTOLIC BLOOD PRESSURE: 76 MMHG | OXYGEN SATURATION: 96 % | BODY MASS INDEX: 53.92 KG/M2 | WEIGHT: 293 LBS | HEART RATE: 71 BPM

## 2022-08-08 DIAGNOSIS — G47.33 OBSTRUCTIVE SLEEP APNEA (ADULT) (PEDIATRIC): ICD-10-CM

## 2022-08-08 PROCEDURE — 99214 OFFICE O/P EST MOD 30 MIN: CPT

## 2022-08-08 NOTE — ASSESSMENT
[FreeTextEntry1] : moderate ANGLE\par The patient is compliant with CPAP and benefiting from its use. Supplies were renewed.\par f/u 1 yr

## 2022-08-08 NOTE — HISTORY OF PRESENT ILLNESS
[Obstructive Sleep Apnea] : obstructive sleep apnea [Home] : home [CPAP:] : CPAP [TextBox_100] : 9/21 [TextBox_108] : 15 [TextBox_112] : 16 [TextBox_116] : 70 [TextBox_131] : 8 [TextBox_127] : 7/22 [TextBox_129] : 8/2283 [TextBox_133] : 83 [TextBox_137] : 73 [TextBox_141] : 6 [TextBox_143] : 49 [TextBox_147] : 0.5 [TextBox_165] : alert on CPAP

## 2024-11-19 ENCOUNTER — APPOINTMENT (OUTPATIENT)
Dept: PODIATRY | Facility: CLINIC | Age: 59
End: 2024-11-19

## 2024-11-19 VITALS — HEIGHT: 62 IN | WEIGHT: 280 LBS | BODY MASS INDEX: 51.53 KG/M2

## 2024-11-19 PROCEDURE — 99203 OFFICE O/P NEW LOW 30 MIN: CPT

## 2024-11-19 RX ORDER — BUDESONIDE, GLYCOPYRROLATE, AND FORMOTEROL FUMARATE 160; 9; 4.8 UG/1; UG/1; UG/1
160-9-4.8 AEROSOL, METERED RESPIRATORY (INHALATION)
Refills: 0 | Status: ACTIVE | COMMUNITY

## 2024-11-19 RX ORDER — ESCITALOPRAM OXALATE 20 MG/1
TABLET ORAL
Refills: 0 | Status: ACTIVE | COMMUNITY

## 2024-11-19 RX ORDER — METOPROLOL TARTRATE 75 MG/1
TABLET, FILM COATED ORAL
Refills: 0 | Status: ACTIVE | COMMUNITY

## 2024-11-19 RX ORDER — SEMAGLUTIDE 0.68 MG/ML
2 INJECTION, SOLUTION SUBCUTANEOUS
Refills: 0 | Status: ACTIVE | COMMUNITY

## 2024-12-17 ENCOUNTER — APPOINTMENT (OUTPATIENT)
Dept: PODIATRY | Facility: CLINIC | Age: 59
End: 2024-12-17

## 2024-12-21 NOTE — PATIENT PROFILE ADULT - CHOOSE INDICATION TO IMMUNIZE (AN ORDER WILL BE GENERATED WHEN THIS NOTE IS SAVED):
[facility] Sepsis Follow Up Note     Patient's sepsis care was reviewed, and yes a perfusion exam was performed within 6 hours of SIRS/sepsis presentation after bolus and shows adequate tissue perfusion by non-invasive monitoring on 12/20/2024.     Patient has received appropriate sepsis care and a fluid challenge of Other- Patient to receive 1,500 mL volume other than 30cc/kg due to Volume overload due to- pleural effusion, peripheral edema .      Salina Buenrostro, PA-C  [facility]    
Patient is not pregnant (male or female)

## 2025-08-17 ENCOUNTER — NON-APPOINTMENT (OUTPATIENT)
Age: 60
End: 2025-08-17